# Patient Record
Sex: FEMALE | Race: WHITE | NOT HISPANIC OR LATINO | Employment: OTHER | URBAN - METROPOLITAN AREA
[De-identification: names, ages, dates, MRNs, and addresses within clinical notes are randomized per-mention and may not be internally consistent; named-entity substitution may affect disease eponyms.]

---

## 2016-06-16 LAB — CRC RECOMMENDATION EXT: NORMAL

## 2019-12-03 ENCOUNTER — HISTORICAL (OUTPATIENT)
Dept: ADMINISTRATIVE | Facility: HOSPITAL | Age: 69
End: 2019-12-03

## 2019-12-17 ENCOUNTER — HISTORICAL (OUTPATIENT)
Dept: ADMINISTRATIVE | Facility: HOSPITAL | Age: 69
End: 2019-12-17

## 2020-02-01 LAB
BILIRUB SERPL-MCNC: NORMAL MG/DL
BLOOD URINE, POC: NEGATIVE
CLARITY, POC UA: NORMAL
GLUCOSE UR QL STRIP: NORMAL
KETONES UR QL STRIP: NEGATIVE
LEUKOCYTE EST, POC UA: NEGATIVE
NITRITE, POC UA: NEGATIVE
PH, POC UA: 5
PROTEIN, POC: NEGATIVE
SPECIFIC GRAVITY, POC UA: 1.02
UROBILINOGEN, POC UA: NORMAL

## 2020-05-11 ENCOUNTER — HISTORICAL (OUTPATIENT)
Dept: ADMINISTRATIVE | Facility: HOSPITAL | Age: 70
End: 2020-05-11

## 2020-05-11 LAB
ABS NEUT (OLG): 7.59 X10(3)/MCL (ref 2.1–9.2)
ALBUMIN SERPL-MCNC: 4 GM/DL (ref 3.4–5)
ALBUMIN/GLOB SERPL: 1.1 RATIO (ref 1.1–2)
ALP SERPL-CCNC: 114 UNIT/L (ref 40–150)
ALT SERPL-CCNC: 6 UNIT/L (ref 0–55)
AST SERPL-CCNC: 15 UNIT/L (ref 5–34)
BASOPHILS # BLD AUTO: 0 X10(3)/MCL (ref 0–0.2)
BASOPHILS NFR BLD AUTO: 0.3 %
BILIRUB SERPL-MCNC: 0.2 MG/DL
BILIRUBIN DIRECT+TOT PNL SERPL-MCNC: 0.1 MG/DL (ref 0–0.5)
BILIRUBIN DIRECT+TOT PNL SERPL-MCNC: 0.1 MG/DL (ref 0–0.8)
BUN SERPL-MCNC: 25 MG/DL (ref 9.8–20.1)
CALCIUM SERPL-MCNC: 9.6 MG/DL (ref 8.4–10.2)
CEA SERPL-MCNC: <1.73 MG/ML (ref 0–3)
CHLORIDE SERPL-SCNC: 104 MMOL/L (ref 98–107)
CO2 SERPL-SCNC: 23 MMOL/L (ref 23–31)
CREAT SERPL-MCNC: 0.76 MG/DL (ref 0.55–1.02)
EOSINOPHIL # BLD AUTO: 0.2 X10(3)/MCL (ref 0–0.9)
EOSINOPHIL NFR BLD AUTO: 1.7 %
ERYTHROCYTE [DISTWIDTH] IN BLOOD BY AUTOMATED COUNT: 15.7 % (ref 11.5–17)
GLOBULIN SER-MCNC: 3.7 GM/DL (ref 2.4–3.5)
GLUCOSE SERPL-MCNC: 128 MG/DL (ref 82–115)
HCT VFR BLD AUTO: 38.5 % (ref 37–47)
HGB BLD-MCNC: 12.3 GM/DL (ref 12–16)
LYMPHOCYTES # BLD AUTO: 2 X10(3)/MCL (ref 0.6–4.6)
LYMPHOCYTES NFR BLD AUTO: 18.9 %
MCH RBC QN AUTO: 26.4 PG (ref 27–31)
MCHC RBC AUTO-ENTMCNC: 31.9 GM/DL (ref 33–36)
MCV RBC AUTO: 82.6 FL (ref 80–94)
MONOCYTES # BLD AUTO: 0.5 X10(3)/MCL (ref 0.1–1.3)
MONOCYTES NFR BLD AUTO: 5.1 %
NEUTROPHILS # BLD AUTO: 7.6 X10(3)/MCL (ref 2.1–9.2)
NEUTROPHILS NFR BLD AUTO: 73.8 %
PLATELET # BLD AUTO: 306 X10(3)/MCL (ref 130–400)
PMV BLD AUTO: 10.3 FL (ref 9.4–12.4)
POTASSIUM SERPL-SCNC: 4.1 MMOL/L (ref 3.5–5.1)
PROT SERPL-MCNC: 7.7 GM/DL (ref 5.8–7.6)
RBC # BLD AUTO: 4.66 X10(6)/MCL (ref 4.2–5.4)
SODIUM SERPL-SCNC: 138 MMOL/L (ref 136–145)
WBC # SPEC AUTO: 10.3 X10(3)/MCL (ref 4.5–11.5)

## 2020-07-13 ENCOUNTER — HISTORICAL (OUTPATIENT)
Dept: ADMINISTRATIVE | Facility: HOSPITAL | Age: 70
End: 2020-07-13

## 2020-07-13 LAB
ABS NEUT (OLG): 6.69 X10(3)/MCL (ref 2.1–9.2)
ALBUMIN SERPL-MCNC: 4.3 GM/DL (ref 3.4–4.8)
ALBUMIN/GLOB SERPL: 1.2 RATIO (ref 1.1–2)
ALP SERPL-CCNC: 112 UNIT/L (ref 40–150)
ALT SERPL-CCNC: 9 UNIT/L (ref 0–55)
AST SERPL-CCNC: 20 UNIT/L (ref 5–34)
BASOPHILS # BLD AUTO: 0.1 X10(3)/MCL (ref 0–0.2)
BASOPHILS NFR BLD AUTO: 0.7 %
BILIRUB SERPL-MCNC: 0.5 MG/DL
BILIRUBIN DIRECT+TOT PNL SERPL-MCNC: 0.2 MG/DL (ref 0–0.5)
BILIRUBIN DIRECT+TOT PNL SERPL-MCNC: 0.3 MG/DL (ref 0–0.8)
BUN SERPL-MCNC: 16.6 MG/DL (ref 9.8–20.1)
CALCIUM SERPL-MCNC: 9.8 MG/DL (ref 8.4–10.2)
CEA SERPL-MCNC: <1.73 NG/ML (ref 0–3)
CHLORIDE SERPL-SCNC: 105 MMOL/L (ref 98–107)
CO2 SERPL-SCNC: 27 MMOL/L (ref 23–31)
CREAT SERPL-MCNC: 0.92 MG/DL (ref 0.55–1.02)
EOSINOPHIL # BLD AUTO: 0.1 X10(3)/MCL (ref 0–0.9)
EOSINOPHIL NFR BLD AUTO: 1.4 %
ERYTHROCYTE [DISTWIDTH] IN BLOOD BY AUTOMATED COUNT: 15.4 % (ref 11.5–17)
GLOBULIN SER-MCNC: 3.7 GM/DL (ref 2.4–3.5)
GLUCOSE SERPL-MCNC: 83 MG/DL (ref 82–115)
HCT VFR BLD AUTO: 39.5 % (ref 37–47)
HGB BLD-MCNC: 12.4 GM/DL (ref 12–16)
LYMPHOCYTES # BLD AUTO: 1.7 X10(3)/MCL (ref 0.6–4.6)
LYMPHOCYTES NFR BLD AUTO: 18.4 %
MCH RBC QN AUTO: 27.1 PG (ref 27–31)
MCHC RBC AUTO-ENTMCNC: 31.4 GM/DL (ref 33–36)
MCV RBC AUTO: 86.4 FL (ref 80–94)
MONOCYTES # BLD AUTO: 0.6 X10(3)/MCL (ref 0.1–1.3)
MONOCYTES NFR BLD AUTO: 6.2 %
NEUTROPHILS # BLD AUTO: 6.7 X10(3)/MCL (ref 2.1–9.2)
NEUTROPHILS NFR BLD AUTO: 73.2 %
PLATELET # BLD AUTO: 329 X10(3)/MCL (ref 130–400)
PMV BLD AUTO: 10.7 FL (ref 9.4–12.4)
POTASSIUM SERPL-SCNC: 4.6 MMOL/L (ref 3.5–5.1)
PROT SERPL-MCNC: 8 GM/DL (ref 5.8–7.6)
RBC # BLD AUTO: 4.57 X10(6)/MCL (ref 4.2–5.4)
SODIUM SERPL-SCNC: 142 MMOL/L (ref 136–145)
WBC # SPEC AUTO: 9.1 X10(3)/MCL (ref 4.5–11.5)

## 2020-10-14 ENCOUNTER — HISTORICAL (OUTPATIENT)
Dept: HEMATOLOGY/ONCOLOGY | Facility: CLINIC | Age: 70
End: 2020-10-14

## 2020-10-14 LAB
ABS NEUT (OLG): 8.21 X10(3)/MCL (ref 2.1–9.2)
ALBUMIN SERPL-MCNC: 4.4 GM/DL (ref 3.4–5)
ALBUMIN/GLOB SERPL: 1.2 RATIO (ref 1.1–2)
ALP SERPL-CCNC: 120 UNIT/L (ref 40–150)
ALT SERPL-CCNC: 7 UNIT/L (ref 0–55)
AST SERPL-CCNC: 14 UNIT/L (ref 5–34)
BASOPHILS # BLD AUTO: 0.1 X10(3)/MCL (ref 0–0.2)
BASOPHILS NFR BLD AUTO: 0.6 %
BILIRUB SERPL-MCNC: 0.3 MG/DL
BILIRUBIN DIRECT+TOT PNL SERPL-MCNC: 0.1 MG/DL (ref 0–0.5)
BILIRUBIN DIRECT+TOT PNL SERPL-MCNC: 0.2 MG/DL (ref 0–0.8)
BUN SERPL-MCNC: 18.8 MG/DL (ref 9.8–20.1)
CALCIUM SERPL-MCNC: 9.4 MG/DL (ref 8.4–10.2)
CEA SERPL-MCNC: <1.73 NG/ML (ref 0–3)
CHLORIDE SERPL-SCNC: 103 MMOL/L (ref 98–107)
CO2 SERPL-SCNC: 24 MMOL/L (ref 23–31)
CREAT SERPL-MCNC: 0.96 MG/DL (ref 0.55–1.02)
EOSINOPHIL # BLD AUTO: 0.2 X10(3)/MCL (ref 0–0.9)
EOSINOPHIL NFR BLD AUTO: 1.6 %
ERYTHROCYTE [DISTWIDTH] IN BLOOD BY AUTOMATED COUNT: 14 % (ref 11.5–17)
GLOBULIN SER-MCNC: 3.6 GM/DL (ref 2.4–3.5)
GLUCOSE SERPL-MCNC: 178 MG/DL (ref 82–115)
HCT VFR BLD AUTO: 40.2 % (ref 37–47)
HGB BLD-MCNC: 12.9 GM/DL (ref 12–16)
LYMPHOCYTES # BLD AUTO: 2 X10(3)/MCL (ref 0.6–4.6)
LYMPHOCYTES NFR BLD AUTO: 18.5 %
MCH RBC QN AUTO: 27.3 PG (ref 27–31)
MCHC RBC AUTO-ENTMCNC: 32.1 GM/DL (ref 33–36)
MCV RBC AUTO: 85 FL (ref 80–94)
MONOCYTES # BLD AUTO: 0.6 X10(3)/MCL (ref 0.1–1.3)
MONOCYTES NFR BLD AUTO: 5.2 %
NEUTROPHILS # BLD AUTO: 8.2 X10(3)/MCL (ref 2.1–9.2)
NEUTROPHILS NFR BLD AUTO: 73.9 %
PLATELET # BLD AUTO: 297 X10(3)/MCL (ref 130–400)
PMV BLD AUTO: 10.8 FL (ref 9.4–12.4)
POTASSIUM SERPL-SCNC: 4.2 MMOL/L (ref 3.5–5.1)
PROT SERPL-MCNC: 8 GM/DL (ref 5.8–7.6)
RBC # BLD AUTO: 4.73 X10(6)/MCL (ref 4.2–5.4)
SODIUM SERPL-SCNC: 141 MMOL/L (ref 136–145)
WBC # SPEC AUTO: 11.1 X10(3)/MCL (ref 4.5–11.5)

## 2021-04-27 ENCOUNTER — HISTORICAL (OUTPATIENT)
Dept: ADMINISTRATIVE | Facility: HOSPITAL | Age: 71
End: 2021-04-27

## 2021-04-27 LAB
ABS NEUT (OLG): 5.35 X10(3)/MCL (ref 2.1–9.2)
ALBUMIN SERPL-MCNC: 4.2 GM/DL (ref 3.4–4.8)
ALBUMIN/GLOB SERPL: 1.1 RATIO (ref 1.1–2)
ALP SERPL-CCNC: 123 UNIT/L (ref 40–150)
ALT SERPL-CCNC: 8 UNIT/L (ref 0–55)
AST SERPL-CCNC: 14 UNIT/L (ref 5–34)
BASOPHILS # BLD AUTO: 0 X10(3)/MCL (ref 0–0.2)
BASOPHILS NFR BLD AUTO: 0.7 %
BILIRUB SERPL-MCNC: 0.2 MG/DL
BILIRUBIN DIRECT+TOT PNL SERPL-MCNC: 0.1 MG/DL (ref 0–0.5)
BILIRUBIN DIRECT+TOT PNL SERPL-MCNC: 0.1 MG/DL (ref 0–0.8)
BUN SERPL-MCNC: 22.9 MG/DL (ref 9.8–20.1)
CALCIUM SERPL-MCNC: 10.2 MG/DL (ref 8.4–10.2)
CEA SERPL-MCNC: <1.73 NG/ML (ref 0–3)
CHLORIDE SERPL-SCNC: 105 MMOL/L (ref 98–107)
CO2 SERPL-SCNC: 27 MMOL/L (ref 23–31)
CREAT SERPL-MCNC: 0.96 MG/DL (ref 0.55–1.02)
EOSINOPHIL # BLD AUTO: 0.2 X10(3)/MCL (ref 0–0.9)
EOSINOPHIL NFR BLD AUTO: 2.5 %
ERYTHROCYTE [DISTWIDTH] IN BLOOD BY AUTOMATED COUNT: 14.7 % (ref 11.5–17)
GLOBULIN SER-MCNC: 3.7 GM/DL (ref 2.4–3.5)
GLUCOSE SERPL-MCNC: 122 MG/DL (ref 82–115)
HCT VFR BLD AUTO: 41.2 % (ref 37–47)
HGB BLD-MCNC: 12.7 GM/DL (ref 12–16)
LYMPHOCYTES # BLD AUTO: 1.5 X10(3)/MCL (ref 0.6–4.6)
LYMPHOCYTES NFR BLD AUTO: 19.4 %
MCH RBC QN AUTO: 27.5 PG (ref 27–31)
MCHC RBC AUTO-ENTMCNC: 30.8 GM/DL (ref 33–36)
MCV RBC AUTO: 89.4 FL (ref 80–94)
MONOCYTES # BLD AUTO: 0.5 X10(3)/MCL (ref 0.1–1.3)
MONOCYTES NFR BLD AUTO: 6.7 %
NEUTROPHILS # BLD AUTO: 5.4 X10(3)/MCL (ref 2.1–9.2)
NEUTROPHILS NFR BLD AUTO: 70.4 %
PLATELET # BLD AUTO: 279 X10(3)/MCL (ref 130–400)
PMV BLD AUTO: 10.6 FL (ref 9.4–12.4)
POTASSIUM SERPL-SCNC: 4.9 MMOL/L (ref 3.5–5.1)
PROT SERPL-MCNC: 7.9 GM/DL (ref 5.8–7.6)
RBC # BLD AUTO: 4.61 X10(6)/MCL (ref 4.2–5.4)
SODIUM SERPL-SCNC: 142 MMOL/L (ref 136–145)
WBC # SPEC AUTO: 7.6 X10(3)/MCL (ref 4.5–11.5)

## 2021-07-27 ENCOUNTER — HISTORICAL (OUTPATIENT)
Dept: RESPIRATORY THERAPY | Facility: HOSPITAL | Age: 71
End: 2021-07-27

## 2021-07-27 ENCOUNTER — HISTORICAL (OUTPATIENT)
Dept: HEMATOLOGY/ONCOLOGY | Facility: CLINIC | Age: 71
End: 2021-07-27

## 2021-07-27 LAB
ABS NEUT (OLG): 7.67 X10(3)/MCL (ref 2.1–9.2)
ALBUMIN SERPL-MCNC: 4.1 GM/DL (ref 3.4–4.8)
ALBUMIN/GLOB SERPL: 1.1 RATIO (ref 1.1–2)
ALP SERPL-CCNC: 114 UNIT/L (ref 40–150)
ALT SERPL-CCNC: 9 UNIT/L (ref 0–55)
AST SERPL-CCNC: 17 UNIT/L (ref 5–34)
BASOPHILS # BLD AUTO: 0.1 X10(3)/MCL (ref 0–0.2)
BASOPHILS NFR BLD AUTO: 0.6 %
BILIRUB SERPL-MCNC: 0.3 MG/DL
BILIRUBIN DIRECT+TOT PNL SERPL-MCNC: 0.1 MG/DL (ref 0–0.5)
BILIRUBIN DIRECT+TOT PNL SERPL-MCNC: 0.2 MG/DL (ref 0–0.8)
BUN SERPL-MCNC: 21.5 MG/DL (ref 9.8–20.1)
CALCIUM SERPL-MCNC: 9.9 MG/DL (ref 8.4–10.2)
CEA SERPL-MCNC: <1.73 NG/ML (ref 0–3)
CHLORIDE SERPL-SCNC: 101 MMOL/L (ref 98–107)
CO2 SERPL-SCNC: 24 MMOL/L (ref 23–31)
CREAT SERPL-MCNC: 0.85 MG/DL (ref 0.55–1.02)
EOSINOPHIL # BLD AUTO: 0.2 X10(3)/MCL (ref 0–0.9)
EOSINOPHIL NFR BLD AUTO: 1.9 %
ERYTHROCYTE [DISTWIDTH] IN BLOOD BY AUTOMATED COUNT: 14.4 % (ref 11.5–17)
GLOBULIN SER-MCNC: 3.8 GM/DL (ref 2.4–3.5)
GLUCOSE SERPL-MCNC: 89 MG/DL (ref 82–115)
HCT VFR BLD AUTO: 45.3 % (ref 37–47)
HGB BLD-MCNC: 14.1 GM/DL (ref 12–16)
LYMPHOCYTES # BLD AUTO: 2 X10(3)/MCL (ref 0.6–4.6)
LYMPHOCYTES NFR BLD AUTO: 19 %
MCH RBC QN AUTO: 27.5 PG (ref 27–31)
MCHC RBC AUTO-ENTMCNC: 31.1 GM/DL (ref 33–36)
MCV RBC AUTO: 88.3 FL (ref 80–94)
MONOCYTES # BLD AUTO: 0.5 X10(3)/MCL (ref 0.1–1.3)
MONOCYTES NFR BLD AUTO: 5.2 %
NEUTROPHILS # BLD AUTO: 7.7 X10(3)/MCL (ref 2.1–9.2)
NEUTROPHILS NFR BLD AUTO: 73.1 %
PLATELET # BLD AUTO: 278 X10(3)/MCL (ref 130–400)
PMV BLD AUTO: 10.3 FL (ref 9.4–12.4)
POTASSIUM SERPL-SCNC: 4.5 MMOL/L (ref 3.5–5.1)
PROT SERPL-MCNC: 7.9 GM/DL (ref 5.8–7.6)
RBC # BLD AUTO: 5.13 X10(6)/MCL (ref 4.2–5.4)
SODIUM SERPL-SCNC: 139 MMOL/L (ref 136–145)
WBC # SPEC AUTO: 10.5 X10(3)/MCL (ref 4.5–11.5)

## 2021-12-20 LAB
LEFT EYE DM RETINOPATHY: NEGATIVE
RIGHT EYE DM RETINOPATHY: NEGATIVE

## 2022-02-16 ENCOUNTER — HISTORICAL (OUTPATIENT)
Dept: HEMATOLOGY/ONCOLOGY | Facility: CLINIC | Age: 72
End: 2022-02-16

## 2022-02-16 LAB
ABS NEUT (OLG): 10.19 (ref 2.1–9.2)
ALBUMIN SERPL-MCNC: 4.3 G/DL (ref 3.4–4.8)
ALBUMIN/GLOB SERPL: 1.2 {RATIO} (ref 1.1–2)
ALP SERPL-CCNC: 117 U/L (ref 40–150)
ALT SERPL-CCNC: 11 U/L (ref 0–55)
AST SERPL-CCNC: 20 U/L (ref 5–34)
BASOPHILS # BLD AUTO: 0.1 10*3/UL (ref 0–0.2)
BASOPHILS NFR BLD AUTO: 0.5 %
BILIRUB SERPL-MCNC: 0.3 MG/DL
BILIRUBIN DIRECT+TOT PNL SERPL-MCNC: 0.1 (ref 0–0.5)
BILIRUBIN DIRECT+TOT PNL SERPL-MCNC: 0.2 (ref 0–0.8)
BUN SERPL-MCNC: 21.3 MG/DL (ref 9.8–20.1)
CALCIUM SERPL-MCNC: 10.1 MG/DL (ref 8.7–10.5)
CEA SERPL-MCNC: <1.73 NG/ML (ref 0–3)
CHLORIDE SERPL-SCNC: 100 MMOL/L (ref 98–107)
CO2 SERPL-SCNC: 26 MMOL/L (ref 23–31)
CREAT SERPL-MCNC: 1.15 MG/DL (ref 0.55–1.02)
EOSINOPHIL # BLD AUTO: 0.1 10*3/UL (ref 0–0.9)
EOSINOPHIL NFR BLD AUTO: 0.9 %
ERYTHROCYTE [DISTWIDTH] IN BLOOD BY AUTOMATED COUNT: 14.5 % (ref 11.5–17)
GLOBULIN SER-MCNC: 3.7 G/DL (ref 2.4–3.5)
GLUCOSE SERPL-MCNC: 178 MG/DL (ref 82–115)
HCT VFR BLD AUTO: 41 % (ref 37–47)
HEMOLYSIS INTERF INDEX SERPL-ACNC: 8
HGB BLD-MCNC: 13 G/DL (ref 12–16)
ICTERIC INTERF INDEX SERPL-ACNC: 0
LIPEMIC INTERF INDEX SERPL-ACNC: 15
LYMPHOCYTES # BLD AUTO: 1.6 10*3/UL (ref 0.6–4.6)
LYMPHOCYTES NFR BLD AUTO: 13.1 %
MANUAL DIFF? (OHS): NO
MCH RBC QN AUTO: 28.1 PG (ref 27–31)
MCHC RBC AUTO-ENTMCNC: 31.7 G/DL (ref 33–36)
MCV RBC AUTO: 88.7 FL (ref 80–94)
MONOCYTES # BLD AUTO: 0.5 10*3/UL (ref 0.1–1.3)
MONOCYTES NFR BLD AUTO: 4.3 %
NEUTROPHILS # BLD AUTO: 10.2 10*3/UL (ref 2.1–9.2)
NEUTROPHILS NFR BLD AUTO: 81 %
PLATELET # BLD AUTO: 326 10*3/UL (ref 130–400)
PMV BLD AUTO: 10.5 FL (ref 9.4–12.4)
POTASSIUM SERPL-SCNC: 5.1 MMOL/L (ref 3.5–5.1)
PROT SERPL-MCNC: 8 G/DL (ref 5.8–7.6)
RBC # BLD AUTO: 4.62 10*6/UL (ref 4.2–5.4)
SODIUM SERPL-SCNC: 137 MMOL/L (ref 136–145)
WBC # SPEC AUTO: 12.6 10*3/UL (ref 4.5–11.5)

## 2022-03-03 ENCOUNTER — HISTORICAL (OUTPATIENT)
Dept: ADMINISTRATIVE | Facility: HOSPITAL | Age: 72
End: 2022-03-03

## 2022-03-03 LAB
CREAT UR-MCNC: 68 MG/DL (ref 45–106)
MICROALBUMIN UR-MCNC: 10.1
MICROALBUMIN/CREAT RATIO PNL UR: 14.9 (ref 0–30)

## 2022-04-10 ENCOUNTER — HISTORICAL (OUTPATIENT)
Dept: ADMINISTRATIVE | Facility: HOSPITAL | Age: 72
End: 2022-04-10
Payer: MEDICARE

## 2022-04-26 VITALS
DIASTOLIC BLOOD PRESSURE: 82 MMHG | OXYGEN SATURATION: 99 % | SYSTOLIC BLOOD PRESSURE: 136 MMHG | WEIGHT: 145.5 LBS | HEIGHT: 63 IN | BODY MASS INDEX: 25.78 KG/M2

## 2022-04-30 NOTE — OP NOTE
Patient:   Ileana Mathews            MRN: [id number]            FIN: [id number]               Age:   69 years     Sex:  Female     :  1950   Associated Diagnoses:   None   Author:   Jose J Mathias MD          Preoperative Diagnosis: Nuclear sclerotic cataract [Left /] eye.    Postoperative Diagnosis: Same.    Anesthesia: Local    Procedure: Phacoemulsification of cataract with posterior chamber implant of the [Left/\ eye.    This patient is a [  ] year old who was given a diagnosis of severe cataracts of both eyes with [20/40  ] vision [ os ]. The risks and benefits of cataract surgery were explained; the patient was consented and desired to have the surgery done. The patient was given topical anesthesia using 1% Lidocaine Jelly and the patient was prepped and draped in sterile fashion.    The microscope was centered and focused in a temporal position and a super sharp blade was used to make a paracentesis in the corneal limbus. Dispersive Viscoelastic was then placed in the anterior chamber. A 2.4 mm keratome blade was used to enter the anterior chamber in a self-sealing type technique. The cystatome blade was then used to initiate a capsulorrhexis which was completed 360 degrees with Utrata forceps. BSS in an AC cannula was then used to perform hydrodissection and hydrodilineation. Phacoemulsification was then accomplished by creating a deep groove down the middle of the nucleus, then  it into 2 halves with the phacotip and the Jo chopper and finishing the removal with a stop and chop technique.  The cortex was then removed using the I and A unit. All the lens material was removed without any tears to the anterior or posterior capsule. Cohesive Viscoelastic was then injected to inflate the capsular bag. A foldable lens was then injected into the capsular bag. The lens was observed to be securely placed into the bag. The I and A was then used to remove the remaining viscoelastic. A  10-0 Biosorb incisional suture [was not] placed. BSS through an A/C cannula was used to perform stromal hydration in the wound. BSS was also used again to reform the chamber and bring the eye to physiologic IOP.  The wound was checked for leaks and none were found. Copious Vigomox drop and 1-2 drops of, Prednisolone Acetate 1% were placed topically prior to removing the lid specular and drapes.  The drapes were then removed. The patient will be sent to recovery and instructed to use Vigamox, Ketorolac, and Predinsolone Acetate 1% three times a day as well as follow all instructions on the postoperative sheet given to them and explained after surgery. The patient will return to see Dr. Mathias at their scheduled appointment within 24-36 hours after surgery.      Jose J Mathias M.D.              FAUSTINO/[facility]ine           [date / time]

## 2022-05-23 DIAGNOSIS — C50.512 MALIGNANT NEOPLASM OF LOWER-OUTER QUADRANT OF LEFT FEMALE BREAST, UNSPECIFIED ESTROGEN RECEPTOR STATUS: Primary | ICD-10-CM

## 2022-05-27 PROCEDURE — 86300 IMMUNOASSAY TUMOR CA 15-3: CPT | Performed by: INTERNAL MEDICINE

## 2022-07-22 PROBLEM — C50.412 MALIGNANT NEOPLASM OF UPPER-OUTER QUADRANT OF LEFT FEMALE BREAST: Status: ACTIVE | Noted: 2022-07-22

## 2022-07-22 PROBLEM — C78.02 SECONDARY MALIGNANT NEOPLASM OF LEFT LUNG: Status: ACTIVE | Noted: 2022-07-22

## 2022-07-22 NOTE — PROGRESS NOTES
Subjective:       Patient ID: Ileana Mathews is a 71 y.o. female.    Chief Complaint: I feel really good    Diagnosis: Recurrent metastatic breast cancer - ER low +/MT -, HER-2 negative                   Solitary left lung met s/p left pneumonectomy 11/18                  Postmenopausal s/p hysterectomy      + COVID-19 vaccination    Current Treatment: Arimidex 1 mg daily (restarted 11/18)  Treatment History: Neoadjuvant TC x4 2008 --> B mastectomies --> TC x4 --> XRT --> Arimidex x 7 yrs    Clinical History:  Patient was initially diagnosed with a stage III left breast cancer in 2008 with 2 out of 27 positive axillary lymph nodes. She was treated by Dr. Donald Foss in [place]. Her pathology was mixed ductal and lobular - ER +88%, MT +26% and HER-2 negative ([facility]). She underwent neoadjuvant chemotherapy with 4 cycles of TC followed by bilateral mastectomies and 4 more cycles of TC. After completion of chemotherapy, she received adjuvant radiation therapy followed by Arimidex for 7 years. She transitioned to the [facility] group 6/16 with Dr. Yan. Ongoing observation was recommended. A solitary pulmonary nodule was discovered on surveillance imaging in the left lung in 2018. Follow-up scan showed increased size of the nodule with no other findings of metastatic disease. PET scan showed no additional suspicious areas. She was referred to CV surgery for a planned wedge resection of the pulmonary nodule. Due to complications at the time of surgery, she required a left pneumonectomy. I do not have a copy of the operative report, but the patient stated there were bleeding complications due to a vascular injury. Surgical pathology showed a metastatic adenocarcinoma consistent with breast primary measuring 1.1 cm. Cells were positive for CK7, MIREYA-3, GCDFP15 and mammoglobin and negative for TTF-1, CK 20, CDX2 and PAX-8. 10 resected lymph nodes were negative for involvement. Tumor was low ER +1.8%, MT  negative and HER-2 negative ([facility]). She was placed back on Arimidex 1 mg daily.    Surveillance CT of the chest 12/5/19 ([facility]) showed postoperative changes from previous left pneumonectomy and no findings suspicious for metastatic disease including the visualized upper abdomen. She was seen in the ER for lower abdominal pain 2/1/20. CT scan of the abdomen and pelvis without contrast showed a large amount of stool in the colon and rectum, cholelithiasis and a left renal cyst. CT scan of the chest 2/27/20 noted a 3 mm nodule in the right anterior lung which on retrospect was present and stable on the previous CT scan. Left pneumonectomy changes were stable with mediastinal shift. There were no other lesions suspicious for metastatic disease.    She was seen as a new patient at [facility]11[facility]20 to establish ongoing Oncology care and follow-up. She was still taking Arimidex 1 mg daily. She had no significant side effects related to the treatment. She was continued on Arimidex. Bone density exam 10/15/20 showed osteopenia of the AP spine, left and right hip with T scores of -1.0, -2.0 and -2.0 respectively.  CT scans of the chest, abdomen and pelvis 8/23/21 showed changes from her prior pneumonectomy with a small left pleural fluid collection and chronic emphysematous changes in the right lung.  There was a small, stable RUL nodule unchanged from previous exams.  There was no evidence of metastatic disease in the abdomen or pelvis.        Interval History   Mrs. Mathews is here today by herself for a follow-up appointment.  She was due to come in for follow-up in May, and had to reschedule.  She feels well.  She denies any recent illnesses or procedures.  She is taking her Arimidex daily as directed. Previous CT scans of the chest, abdomen and pelvis 2/16/22 ([facility]) showed stable changes from prior left pneumonectomy and a subcentimeter triangular focus in the right upper lobe unchanged from her  previous exams.  There were no abnormal or suspicious findings in the abdomen or pelvis. She is now taking THC gummies (2) at bedtime.  Her blood pressure has improved and is no longer taking Xanax.  Overall, she feels much better.  She is still taking Lyrica for neuropathic pain.  She denies any changes to her self breast examination.  Laboratory 5/27/22 was unremarkable and reviewed today with the patient.    Review of patient's allergies indicates:  No Known Allergies   Review of Systems   Constitutional: Positive for fatigue.   HENT: Negative.    Eyes: Negative.    Respiratory: Positive for shortness of breath (URIBE).    Cardiovascular: Negative.    Gastrointestinal: Negative.    Endocrine: Negative.    Genitourinary: Negative.    Integumentary:  Negative.   Allergic/Immunologic: Negative.    Neurological: Negative.    Hematological: Negative for adenopathy.   Psychiatric/Behavioral: Negative.          PMHx:  HTN, hyperlipidemia, diabetes mellitus, SVT, COPD, GERD, arthritis, anxiety/depression, panic attacks    PSHx:  Tonsils, bladder suspension, hysterectomy/BSO, rhinoplasty, Mediport placement and removal, left pneumonectomy    SH:  Former smoker 1-2.5 PPD x 40 yrs, quit 11/18. Rare alcohol use. Lives in [place] with her , retired dispatcher for the [employer]    FH:  Her mother had bilateral breast cancer, paternal grandmother had lung cancer.        Objective:     Vitals:    07/25/22 1431   BP: 120/71   Pulse: 71   Resp: 18   Temp: 98 °F (36.7 °C)         Physical Exam  Constitutional:       Appearance: Normal appearance.   HENT:      Head: Normocephalic.      Mouth/Throat:      Mouth: Mucous membranes are dry.   Eyes:      General: No scleral icterus.     Pupils: Pupils are equal, round, and reactive to light.   Cardiovascular:      Rate and Rhythm: Normal rate and regular rhythm.   Pulmonary:      Comments: Absent breath sounds on the left, clear on the right  Chest:      Chest wall: No  tenderness.   Breasts:      Right: No mass, skin change, axillary adenopathy or supraclavicular adenopathy.      Left: No mass, skin change, axillary adenopathy or supraclavicular adenopathy.        Comments: Bilateral mastectomies  Abdominal:      General: Bowel sounds are normal.      Palpations: Abdomen is soft.   Musculoskeletal:         General: Normal range of motion.      Cervical back: Normal range of motion.   Lymphadenopathy:      Cervical: No cervical adenopathy.      Upper Body:      Right upper body: No supraclavicular or axillary adenopathy.      Left upper body: No supraclavicular or axillary adenopathy.   Skin:     General: Skin is warm and dry.   Neurological:      General: No focal deficit present.      Mental Status: She is alert and oriented to person, place, and time.   Psychiatric:         Mood and Affect: Mood normal.         Behavior: Behavior normal.       ECOG SCORE    1 - Restricted in strenuous activity-ambulatory and able to carry out work of a light nature            Lab Results   Component Value Date/Time    WBC 9.9 05/27/2022 01:14 PM    RBC 4.92 05/27/2022 01:14 PM    HGB 13.5 05/27/2022 01:14 PM    HCT 42.9 05/27/2022 01:14 PM     05/27/2022 01:14 PM    ABSNEUTRO 7.4 05/27/2022 01:14 PM    NEUTROAUTO 74.6 05/27/2022 01:14 PM    ABSLYMPH 1.72 05/27/2022 01:14 PM    LYMPHAUTO 17.3 05/27/2022 01:14 PM    ABSEOS 0.16 05/27/2022 01:14 PM    EOSAUTO 1.6 05/27/2022 01:14 PM    ABSBASO 0.05 05/27/2022 01:14 PM    BASOPHILAUTO 0.5 05/27/2022 01:14 PM        Chemistry        Component Value Date/Time     05/27/2022 1314    K 4.2 05/27/2022 1314    CO2 26 05/27/2022 1314    BUN 18.1 05/27/2022 1314    CREATININE 1.22 (H) 05/27/2022 1314        Component Value Date/Time    CALCIUM 9.5 05/27/2022 1314    ALKPHOS 119 05/27/2022 1314    AST 12 05/27/2022 1314    ALT 7 05/27/2022 1314    BILITOT 0.3 05/27/2022 1314    EGFRNONAA 46 05/27/2022 1314        Assessment:   Recurrent  metastatic breast cancer - initial diagnosis 2008, ER/MI +, HER-2 negative  Solitary pulmonary met s/p left pneumonectomy 11/18 - ER low +, MI negative, HER-2 negative  Postmenopausal s/p hysterectomy  Osteopenia          Plan:   Continue Arimidex.  RTC 3 months for follow-up with repeat lab, including tumor markers.  Repeat CT scans 2/23.  All questions answered.    ANGEL OLSON, FNP-C    Other Physicians  Dr. Josey Gilbert

## 2022-07-25 ENCOUNTER — OFFICE VISIT (OUTPATIENT)
Dept: HEMATOLOGY/ONCOLOGY | Facility: CLINIC | Age: 72
End: 2022-07-25
Payer: MEDICARE

## 2022-07-25 VITALS
HEIGHT: 62 IN | DIASTOLIC BLOOD PRESSURE: 71 MMHG | OXYGEN SATURATION: 98 % | RESPIRATION RATE: 18 BRPM | SYSTOLIC BLOOD PRESSURE: 120 MMHG | BODY MASS INDEX: 26.49 KG/M2 | HEART RATE: 71 BPM | WEIGHT: 143.94 LBS | TEMPERATURE: 98 F

## 2022-07-25 DIAGNOSIS — C78.02 SECONDARY MALIGNANT NEOPLASM OF LEFT LUNG: Primary | ICD-10-CM

## 2022-07-25 DIAGNOSIS — C50.412 MALIGNANT NEOPLASM OF UPPER-OUTER QUADRANT OF LEFT FEMALE BREAST, UNSPECIFIED ESTROGEN RECEPTOR STATUS: ICD-10-CM

## 2022-07-25 PROCEDURE — 1160F RVW MEDS BY RX/DR IN RCRD: CPT | Mod: CPTII,S$GLB,, | Performed by: NURSE PRACTITIONER

## 2022-07-25 PROCEDURE — 1159F PR MEDICATION LIST DOCUMENTED IN MEDICAL RECORD: ICD-10-PCS | Mod: CPTII,S$GLB,, | Performed by: NURSE PRACTITIONER

## 2022-07-25 PROCEDURE — 99999 PR PBB SHADOW E&M-EST. PATIENT-LVL V: CPT | Mod: PBBFAC,,, | Performed by: NURSE PRACTITIONER

## 2022-07-25 PROCEDURE — 3288F PR FALLS RISK ASSESSMENT DOCUMENTED: ICD-10-PCS | Mod: CPTII,S$GLB,, | Performed by: NURSE PRACTITIONER

## 2022-07-25 PROCEDURE — 1160F PR REVIEW ALL MEDS BY PRESCRIBER/CLIN PHARMACIST DOCUMENTED: ICD-10-PCS | Mod: CPTII,S$GLB,, | Performed by: NURSE PRACTITIONER

## 2022-07-25 PROCEDURE — 99213 OFFICE O/P EST LOW 20 MIN: CPT | Mod: S$GLB,,, | Performed by: NURSE PRACTITIONER

## 2022-07-25 PROCEDURE — 99999 PR PBB SHADOW E&M-EST. PATIENT-LVL V: ICD-10-PCS | Mod: PBBFAC,,, | Performed by: NURSE PRACTITIONER

## 2022-07-25 PROCEDURE — 3008F PR BODY MASS INDEX (BMI) DOCUMENTED: ICD-10-PCS | Mod: CPTII,S$GLB,, | Performed by: NURSE PRACTITIONER

## 2022-07-25 PROCEDURE — 1101F PR PT FALLS ASSESS DOC 0-1 FALLS W/OUT INJ PAST YR: ICD-10-PCS | Mod: CPTII,S$GLB,, | Performed by: NURSE PRACTITIONER

## 2022-07-25 PROCEDURE — 3074F SYST BP LT 130 MM HG: CPT | Mod: CPTII,S$GLB,, | Performed by: NURSE PRACTITIONER

## 2022-07-25 PROCEDURE — 3074F PR MOST RECENT SYSTOLIC BLOOD PRESSURE < 130 MM HG: ICD-10-PCS | Mod: CPTII,S$GLB,, | Performed by: NURSE PRACTITIONER

## 2022-07-25 PROCEDURE — 3008F BODY MASS INDEX DOCD: CPT | Mod: CPTII,S$GLB,, | Performed by: NURSE PRACTITIONER

## 2022-07-25 PROCEDURE — 1101F PT FALLS ASSESS-DOCD LE1/YR: CPT | Mod: CPTII,S$GLB,, | Performed by: NURSE PRACTITIONER

## 2022-07-25 PROCEDURE — 3078F DIAST BP <80 MM HG: CPT | Mod: CPTII,S$GLB,, | Performed by: NURSE PRACTITIONER

## 2022-07-25 PROCEDURE — 1126F PR PAIN SEVERITY QUANTIFIED, NO PAIN PRESENT: ICD-10-PCS | Mod: CPTII,S$GLB,, | Performed by: NURSE PRACTITIONER

## 2022-07-25 PROCEDURE — 99213 PR OFFICE/OUTPT VISIT, EST, LEVL III, 20-29 MIN: ICD-10-PCS | Mod: S$GLB,,, | Performed by: NURSE PRACTITIONER

## 2022-07-25 PROCEDURE — 1159F MED LIST DOCD IN RCRD: CPT | Mod: CPTII,S$GLB,, | Performed by: NURSE PRACTITIONER

## 2022-07-25 PROCEDURE — 1126F AMNT PAIN NOTED NONE PRSNT: CPT | Mod: CPTII,S$GLB,, | Performed by: NURSE PRACTITIONER

## 2022-07-25 PROCEDURE — 3078F PR MOST RECENT DIASTOLIC BLOOD PRESSURE < 80 MM HG: ICD-10-PCS | Mod: CPTII,S$GLB,, | Performed by: NURSE PRACTITIONER

## 2022-07-25 PROCEDURE — 3288F FALL RISK ASSESSMENT DOCD: CPT | Mod: CPTII,S$GLB,, | Performed by: NURSE PRACTITIONER

## 2022-07-25 RX ORDER — VITAMIN B COMPLEX
1 CAPSULE ORAL DAILY
COMMUNITY
Start: 2021-05-10

## 2022-07-25 RX ORDER — ESOMEPRAZOLE MAGNESIUM 40 MG/1
40 CAPSULE, DELAYED RELEASE ORAL DAILY
COMMUNITY
Start: 2022-07-22 | End: 2023-05-09

## 2022-07-25 RX ORDER — ALPRAZOLAM 1 MG/1
1 TABLET ORAL 3 TIMES DAILY PRN
COMMUNITY
Start: 2022-03-29 | End: 2022-09-06

## 2022-07-25 RX ORDER — AZELASTINE HYDROCHLORIDE, FLUTICASONE PROPIONATE 137; 50 UG/1; UG/1
1 SPRAY, METERED NASAL 2 TIMES DAILY
COMMUNITY
Start: 2022-03-03

## 2022-07-25 RX ORDER — DAPAGLIFLOZIN 10 MG/1
10 TABLET, FILM COATED ORAL DAILY
COMMUNITY
Start: 2022-07-22 | End: 2023-08-10

## 2022-07-25 RX ORDER — DULAGLUTIDE 1.5 MG/.5ML
1.5 INJECTION, SOLUTION SUBCUTANEOUS
COMMUNITY
Start: 2022-07-22 | End: 2023-08-10

## 2022-07-25 RX ORDER — ALBUTEROL SULFATE 90 UG/1
AEROSOL, METERED RESPIRATORY (INHALATION)
COMMUNITY
Start: 2022-07-22

## 2022-07-25 RX ORDER — AMLODIPINE BESYLATE 5 MG/1
1 TABLET ORAL DAILY
COMMUNITY
Start: 2022-07-22 | End: 2023-09-18 | Stop reason: SDUPTHER

## 2022-07-25 RX ORDER — AMOXICILLIN 500 MG
1 CAPSULE ORAL DAILY
COMMUNITY

## 2022-07-25 RX ORDER — GLIMEPIRIDE 4 MG/1
4 TABLET ORAL DAILY
COMMUNITY
Start: 2022-07-22 | End: 2023-09-18 | Stop reason: SDUPTHER

## 2022-07-25 RX ORDER — UMECLIDINIUM BROMIDE AND VILANTEROL TRIFENATATE 62.5; 25 UG/1; UG/1
1 POWDER RESPIRATORY (INHALATION) DAILY
COMMUNITY
Start: 2022-07-22 | End: 2023-03-06 | Stop reason: SDUPTHER

## 2022-07-25 RX ORDER — LANOLIN ALCOHOL/MO/W.PET/CERES
1000 CREAM (GRAM) TOPICAL DAILY
COMMUNITY
End: 2022-09-06

## 2022-07-25 RX ORDER — ISOSORBIDE MONONITRATE 30 MG/1
30 TABLET, EXTENDED RELEASE ORAL DAILY
COMMUNITY
Start: 2022-07-22 | End: 2022-09-06

## 2022-07-25 RX ORDER — CARVEDILOL 3.12 MG/1
3.12 TABLET ORAL 2 TIMES DAILY
COMMUNITY
Start: 2022-06-29 | End: 2022-09-06

## 2022-09-06 ENCOUNTER — OFFICE VISIT (OUTPATIENT)
Dept: INTERNAL MEDICINE | Facility: CLINIC | Age: 72
End: 2022-09-06
Payer: MEDICARE

## 2022-09-06 VITALS
DIASTOLIC BLOOD PRESSURE: 70 MMHG | WEIGHT: 142 LBS | BODY MASS INDEX: 25.16 KG/M2 | HEART RATE: 68 BPM | SYSTOLIC BLOOD PRESSURE: 132 MMHG | HEIGHT: 63 IN | OXYGEN SATURATION: 98 % | RESPIRATION RATE: 18 BRPM

## 2022-09-06 DIAGNOSIS — I27.0 PRIMARY PULMONARY HYPERTENSION: ICD-10-CM

## 2022-09-06 DIAGNOSIS — I10 HYPERTENSION, UNSPECIFIED TYPE: ICD-10-CM

## 2022-09-06 DIAGNOSIS — Z00.00 MEDICARE ANNUAL WELLNESS VISIT, SUBSEQUENT: ICD-10-CM

## 2022-09-06 DIAGNOSIS — I47.10 SUPRAVENTRICULAR TACHYCARDIA: ICD-10-CM

## 2022-09-06 DIAGNOSIS — J44.9 CHRONIC OBSTRUCTIVE PULMONARY DISEASE, UNSPECIFIED COPD TYPE: ICD-10-CM

## 2022-09-06 DIAGNOSIS — E11.65 TYPE 2 DIABETES MELLITUS WITH HYPERGLYCEMIA, WITHOUT LONG-TERM CURRENT USE OF INSULIN: Primary | ICD-10-CM

## 2022-09-06 DIAGNOSIS — N18.31 CHRONIC KIDNEY DISEASE, STAGE 3A: ICD-10-CM

## 2022-09-06 DIAGNOSIS — C50.412 MALIGNANT NEOPLASM OF UPPER-OUTER QUADRANT OF LEFT FEMALE BREAST, UNSPECIFIED ESTROGEN RECEPTOR STATUS: ICD-10-CM

## 2022-09-06 DIAGNOSIS — K21.9 GASTROESOPHAGEAL REFLUX DISEASE, UNSPECIFIED WHETHER ESOPHAGITIS PRESENT: ICD-10-CM

## 2022-09-06 PROCEDURE — 3061F NEG MICROALBUMINURIA REV: CPT | Mod: CPTII,,, | Performed by: STUDENT IN AN ORGANIZED HEALTH CARE EDUCATION/TRAINING PROGRAM

## 2022-09-06 PROCEDURE — 3288F FALL RISK ASSESSMENT DOCD: CPT | Mod: CPTII,,, | Performed by: STUDENT IN AN ORGANIZED HEALTH CARE EDUCATION/TRAINING PROGRAM

## 2022-09-06 PROCEDURE — 1101F PR PT FALLS ASSESS DOC 0-1 FALLS W/OUT INJ PAST YR: ICD-10-PCS | Mod: CPTII,,, | Performed by: STUDENT IN AN ORGANIZED HEALTH CARE EDUCATION/TRAINING PROGRAM

## 2022-09-06 PROCEDURE — 3078F DIAST BP <80 MM HG: CPT | Mod: CPTII,,, | Performed by: STUDENT IN AN ORGANIZED HEALTH CARE EDUCATION/TRAINING PROGRAM

## 2022-09-06 PROCEDURE — 1159F MED LIST DOCD IN RCRD: CPT | Mod: CPTII,,, | Performed by: STUDENT IN AN ORGANIZED HEALTH CARE EDUCATION/TRAINING PROGRAM

## 2022-09-06 PROCEDURE — 3008F PR BODY MASS INDEX (BMI) DOCUMENTED: ICD-10-PCS | Mod: CPTII,,, | Performed by: STUDENT IN AN ORGANIZED HEALTH CARE EDUCATION/TRAINING PROGRAM

## 2022-09-06 PROCEDURE — 1126F AMNT PAIN NOTED NONE PRSNT: CPT | Mod: CPTII,,, | Performed by: STUDENT IN AN ORGANIZED HEALTH CARE EDUCATION/TRAINING PROGRAM

## 2022-09-06 PROCEDURE — 3075F SYST BP GE 130 - 139MM HG: CPT | Mod: CPTII,,, | Performed by: STUDENT IN AN ORGANIZED HEALTH CARE EDUCATION/TRAINING PROGRAM

## 2022-09-06 PROCEDURE — 3075F PR MOST RECENT SYSTOLIC BLOOD PRESS GE 130-139MM HG: ICD-10-PCS | Mod: CPTII,,, | Performed by: STUDENT IN AN ORGANIZED HEALTH CARE EDUCATION/TRAINING PROGRAM

## 2022-09-06 PROCEDURE — 3288F PR FALLS RISK ASSESSMENT DOCUMENTED: ICD-10-PCS | Mod: CPTII,,, | Performed by: STUDENT IN AN ORGANIZED HEALTH CARE EDUCATION/TRAINING PROGRAM

## 2022-09-06 PROCEDURE — 3008F BODY MASS INDEX DOCD: CPT | Mod: CPTII,,, | Performed by: STUDENT IN AN ORGANIZED HEALTH CARE EDUCATION/TRAINING PROGRAM

## 2022-09-06 PROCEDURE — G0439 PR MEDICARE ANNUAL WELLNESS SUBSEQUENT VISIT: ICD-10-PCS | Mod: ,,, | Performed by: STUDENT IN AN ORGANIZED HEALTH CARE EDUCATION/TRAINING PROGRAM

## 2022-09-06 PROCEDURE — 3078F PR MOST RECENT DIASTOLIC BLOOD PRESSURE < 80 MM HG: ICD-10-PCS | Mod: CPTII,,, | Performed by: STUDENT IN AN ORGANIZED HEALTH CARE EDUCATION/TRAINING PROGRAM

## 2022-09-06 PROCEDURE — 1159F PR MEDICATION LIST DOCUMENTED IN MEDICAL RECORD: ICD-10-PCS | Mod: CPTII,,, | Performed by: STUDENT IN AN ORGANIZED HEALTH CARE EDUCATION/TRAINING PROGRAM

## 2022-09-06 PROCEDURE — 1126F PR PAIN SEVERITY QUANTIFIED, NO PAIN PRESENT: ICD-10-PCS | Mod: CPTII,,, | Performed by: STUDENT IN AN ORGANIZED HEALTH CARE EDUCATION/TRAINING PROGRAM

## 2022-09-06 PROCEDURE — G0439 PPPS, SUBSEQ VISIT: HCPCS | Mod: ,,, | Performed by: STUDENT IN AN ORGANIZED HEALTH CARE EDUCATION/TRAINING PROGRAM

## 2022-09-06 PROCEDURE — 3066F PR DOCUMENTATION OF TREATMENT FOR NEPHROPATHY: ICD-10-PCS | Mod: CPTII,,, | Performed by: STUDENT IN AN ORGANIZED HEALTH CARE EDUCATION/TRAINING PROGRAM

## 2022-09-06 PROCEDURE — 1101F PT FALLS ASSESS-DOCD LE1/YR: CPT | Mod: CPTII,,, | Performed by: STUDENT IN AN ORGANIZED HEALTH CARE EDUCATION/TRAINING PROGRAM

## 2022-09-06 PROCEDURE — 3061F PR NEG MICROALBUMINURIA RESULT DOCUMENTED/REVIEW: ICD-10-PCS | Mod: CPTII,,, | Performed by: STUDENT IN AN ORGANIZED HEALTH CARE EDUCATION/TRAINING PROGRAM

## 2022-09-06 PROCEDURE — 3066F NEPHROPATHY DOC TX: CPT | Mod: CPTII,,, | Performed by: STUDENT IN AN ORGANIZED HEALTH CARE EDUCATION/TRAINING PROGRAM

## 2022-09-06 RX ORDER — DICLOFENAC SODIUM 75 MG/1
75 TABLET, DELAYED RELEASE ORAL 2 TIMES DAILY
COMMUNITY
Start: 2022-08-18 | End: 2022-10-26

## 2022-09-06 NOTE — PROGRESS NOTES
Subjective:      Ileana Mathews  09/06/2022  [id number]    Josey Ruelas MD  Patient Care Team:  Josey Vale MD as PCP - General (Internal Medicine)          Visit Type:a scheduled routine follow-up visit    Chief Complaint: Medicare AWV    HPI  Ms Tejeda presents for Medicare Wellness. Patient does not have new complaints. Has chronic neck pain, follows with Ortho. Doing well. Has history of diabetes, follows with Dr Gilbert.     Past Medical History:   Diagnosis Date    Anxiety/depression     Arthritis     Breast cancer     COPD (chronic obstructive pulmonary disease)     Diabetes mellitus     GERD (gastroesophageal reflux disease)     Hyperlipidemia     Hypertension     Panic attacks     SVT (supraventricular tachycardia)      Past Surgical History:   Procedure Laterality Date    BLADDER SUSPENSION      HYSTERECTOMY      BSO    Mediport placement and removal      PNEUMONECTOMY Left     RHINOPLASTY      TONSILLECTOMY       Family History   Problem Relation Age of Onset    Breast cancer Mother         bilateral    Lung cancer Paternal Grandmother      Social History     Tobacco Use    Smoking status: Former     Packs/day: 2.50     Years: 40.00     Pack years: 100.00     Types: Cigarettes     Quit date: 11/1/2018     Years since quitting: 3.8    Smokeless tobacco: Never   Substance and Sexual Activity    Alcohol use: Yes    Drug use: Not on file    Sexual activity: Not on file     Active Problem List with Overview Notes    Diagnosis Date Noted    Type 2 diabetes mellitus with hyperglycemia, without long-term current use of insulin 09/06/2022    Primary pulmonary hypertension 09/06/2022    Supraventricular tachycardia 09/06/2022    Chronic obstructive pulmonary disease, unspecified COPD type 09/06/2022    Chronic kidney disease, stage 3a 09/06/2022    Gastroesophageal reflux disease 09/06/2022    Hypertension 09/06/2022    Medicare annual wellness visit, subsequent 09/06/2022    Malignant neoplasm  of upper-outer quadrant of left female breast 2022    Secondary malignant neoplasm of left lung 2022     Review of patient's allergies indicates:  No Known Allergies    The following were reviewed at this visit: active problem list, medication list, allergies, family history, social history, and health maintenance.    Medications:    Current Outpatient Medications:     albuterol (PROVENTIL/VENTOLIN HFA) 90 mcg/actuation inhaler, SMARTSI Puff(s) Via Inhaler Every 6 Hours, Disp: , Rfl:     amLODIPine (NORVASC) 5 MG tablet, Take 1 tablet by mouth once daily., Disp: , Rfl:     anastrozole (ARIMIDEX) 1 mg Tab, TAKE 1 TABLET BY MOUTH ONCE DAILY, Disp: 30 tablet, Rfl: 6    ANORO ELLIPTA 62.5-25 mcg/actuation DsDv, Inhale 1 puff into the lungs once daily., Disp: , Rfl:     azelastine-fluticasone (DYMISTA) 137-50 mcg/spray Spry nassal spray, 1 spray by Each Nostril route 2 (two) times daily., Disp: , Rfl:     b complex vitamins capsule, Take 1 capsule by mouth once daily., Disp: , Rfl:     diclofenac (VOLTAREN) 75 MG EC tablet, Take 75 mg by mouth 2 (two) times daily., Disp: , Rfl:     esomeprazole (NEXIUM) 40 MG capsule, Take 40 mg by mouth once daily., Disp: , Rfl:     FARXIGA 10 mg tablet, Take 10 mg by mouth once daily., Disp: , Rfl:     glimepiride (AMARYL) 4 MG tablet, Take 4 mg by mouth once daily., Disp: , Rfl:     LINZESS 72 mcg Cap capsule, TAKE 1 CAPSULE ORAL DAILY INSTR:DO NOT CRUSH OR CHEW, Disp: 30 capsule, Rfl: 1    omega-3 fatty acids/fish oil (FISH OIL-OMEGA-3 FATTY ACIDS) 300-1,000 mg capsule, Take 1 g by mouth once daily., Disp: , Rfl:     pregabalin (LYRICA) 50 MG capsule, TAKE ONE CAPSULE BY MOUTH TWO TIMES A DAY EARLY REFILL DUE TO TRAVEL 22 LWS, Disp: 60 capsule, Rfl: 5    TRULICITY 1.5 mg/0.5 mL pen injector, Inject 1.5 mg into the skin every 7 days., Disp: , Rfl:       Medications have been reviewed and reconciled with patient at this visit.  Barriers to medications reviewed with  patient.    Adverse reactions to current medications reviewed with patient..    Over the counter medications reviewed and reconciled with patient.    Opioid Screening: Patient medication list reviewed, patient is not taking prescription opioids. Patient is not using additional opioids than prescribed. Patient is at low risk of substance abuse based on this opioid use history.     Review of Systems   Constitutional:  Negative for chills, fever, malaise/fatigue and weight loss.   HENT:  Negative for congestion, ear discharge, ear pain, hearing loss, sinus pain and sore throat.    Eyes:  Negative for photophobia, pain, discharge and redness.   Respiratory:  Negative for cough, shortness of breath and wheezing.    Cardiovascular:  Negative for chest pain, palpitations and leg swelling.   Gastrointestinal:  Negative for constipation, diarrhea, heartburn, nausea and vomiting.   Genitourinary:  Negative for dysuria, frequency and urgency.   Musculoskeletal:  Negative for falls, joint pain and myalgias.   Skin:  Negative for itching and rash.   Neurological:  Negative for dizziness, focal weakness, weakness and headaches.   Psychiatric/Behavioral:  Negative for depression and memory loss. The patient is not nervous/anxious and does not have insomnia.      What [age]  Are you male or female?: Female  During the past four weeks, how much have you been bothered by emotional problems such as feeling anxious, depressed, irritable, sad, or downhearted and blue?: Slightly  During the past five weeks, has your physical and/or emotional health limited your social activities with family, friends, neighbors, or groups?: Not at all  During the past four weeks, how much bodily pain have you generally had?: No pain  During the past four weeks, was someone available to help if you needed and wanted help?: Yes, quite a bit  During the past four weeks, what was the hardest physical activity you could do for at least two  minutes?: Moderate  Can you get to places out of walking distance without help?  (For example, can you travel alone on buses or taxis, or drive your own car?): Yes  Can you go shopping for groceries or clothes without someone's help?: Yes  Can you prepare your own meals?: Yes  Can you do your own housework without help?: Yes  Because of any health problems, do you need the help of another person with your personal care needs such as eating, bathing, dressing, or getting around the house?: No  Can you handle your own money without help?: Yes  During the past four weeks, how would you rate your health in general?: Good  How have things been going for you during the past four weeks?: Pretty well  Are you having difficulties driving your car?: No  Do you always fasten your seat belt when you are in a car?: Yes, usually  How often in the past four weeks have you been bothered by falling or dizzy when standing up?: Never  How often in the past four weeks have you been bothered by sexual problems?: Never  How often in the past four weeks have you been bothered by trouble eating well?: Never  How often in the past four weeks have you been bothered by teeth or denture problems?: Never  How often in the past four weeks have you been bothered with problems using the telephone?: Never  How often in the past four weeks have you been bothered by tiredness or fatigue?: Never  Have you fallen two or more times in the past year?: No  Are you afraid of falling?: No  Are you a smoker?: No  During the past four weeks, how many drinks of wine, beer, or other alcoholic beverages did you have?: No alcohol at all  Do you exercise for about 20 minutes three or more days a week?: Yes, most of the time  Have you been given any information to help you with hazards in your house that might hurt you?: Yes  Have you been given any information to help you with keeping track of your medications?: Yes  How often do you have trouble taking medicines  "the way you've been told to take them?: I always take them as prescribed  How confident are you that you can control and manage most of your health problems?: Very confident  What is your race? (Check all that apply.):           Objective:      Vitals:    09/06/22 1355   BP: 132/70   BP Location: Right arm   Pulse: 68   Resp: 18   SpO2: 98%   Weight: 64.4 kg (142 lb)   Height: 5' 3" (1.6 m)       Physical Exam  Constitutional:       General: She is not in acute distress.     Appearance: Normal appearance.   HENT:      Head: Normocephalic and atraumatic.   Eyes:      Extraocular Movements: Extraocular movements intact.      Pupils: Pupils are equal, round, and reactive to light.   Cardiovascular:      Rate and Rhythm: Normal rate and regular rhythm.      Pulses: Normal pulses.      Heart sounds: Normal heart sounds. No murmur heard.    No friction rub. No gallop.   Pulmonary:      Effort: Pulmonary effort is normal.      Breath sounds: Normal breath sounds. No wheezing, rhonchi or rales.   Abdominal:      General: Abdomen is flat. Bowel sounds are normal. There is no distension.      Palpations: Abdomen is soft.      Tenderness: There is no abdominal tenderness.   Musculoskeletal:         General: No swelling or tenderness. Normal range of motion.      Cervical back: Normal range of motion and neck supple. No tenderness.      Right lower leg: No edema.      Left lower leg: No edema.   Lymphadenopathy:      Cervical: No cervical adenopathy.   Skin:     Findings: No lesion or rash.   Neurological:      General: No focal deficit present.      Mental Status: She is alert and oriented to person, place, and time.      Cranial Nerves: No cranial nerve deficit.      Sensory: No sensory deficit.      Motor: No weakness.   Psychiatric:         Mood and Affect: Mood normal.         Behavior: Behavior normal.         Thought Content: Thought content normal.       No flowsheet data found.  Fall Risk Assessment - " Outpatient 9/6/2022 7/25/2022   Mobility Status Ambulatory Ambulatory   Number of falls 0 0   Identified as fall risk 0 0                 Depression Screening  Over the past two weeks, has the patient felt down, depressed, or hopeless?: No  Over the past two weeks, has the patient felt little interest or pleasure in doing things?: No  Functional Ability/Safety Screening  Was the patient's timed Up & Go test unsteady or longer than 30 seconds?: No  Does the patient need help with phone, transportation, shopping, preparing meals, housework, laundry, meds, or managing money?: No  Does the patient's home have rugs in the hallway, lack grab bars in the bathroom, lack handrails on the stairs or have poor lighting?: No  Have you noticed any hearing difficulties?: No  Cognitive Function (Assessed through direct observation with due consideration of information obtained by way of patient reports and/or concerns raised by family, friends, caretakers, or others)    Does the patient repeat questions/statements in the same day?: No  Does the patient have trouble remembering the date, year, and time?: No  Does the patient have difficulty managing finances?: No  Does the patient have a decreased sense of direction?: No        Laboratory Reviewed ({Yes)  Lab Results   Component Value Date    WBC 9.9 05/27/2022    HGB 13.5 05/27/2022    HCT 42.9 05/27/2022     05/27/2022    ALT 7 05/27/2022    AST 12 05/27/2022     05/27/2022    K 4.2 05/27/2022    CREATININE 1.22 (H) 05/27/2022    BUN 18.1 05/27/2022    CO2 26 05/27/2022         Assessment and Plan:       Problem List Items Addressed This Visit          Pulmonary    Primary pulmonary hypertension    Chronic obstructive pulmonary disease, unspecified COPD type     Continue current medications             Cardiac/Vascular    Supraventricular tachycardia    Hypertension     Controlled  Continue current medications             Renal/    Chronic kidney disease, stage 3a        Oncology    Malignant neoplasm of upper-outer quadrant of left female breast     On anastrozole  Follows with Oncology             Endocrine    Type 2 diabetes mellitus with hyperglycemia, without long-term current use of insulin - Primary     Follows with Dr Gilbert  Per patient last A1C of 6.0  Up to date with eye exam and foot exam  Continue current medications             GI    Gastroesophageal reflux disease     Stable  Continue current medication            Other    Medicare annual wellness visit, subsequent     Mammogram: up to date  DXA scan: up to date  Colon cancer screening: did FIT test earlier this year, will check records  Pneumonia vaccine: up to date  Singrix vaccine: up to date                Care Plan/Goals: Reviewed    Goals    None         Follow up: Follow up in about 6 months (around 3/6/2023) for Bp follow up.    No orders of the defined types were placed in this encounter.      Medicare Annual Wellness and Personalized Prevention Plan:   Fall Risk + Home Safety + Hearing Impairment + Depression Screen + Cognitive Impairment Screen + Health Risk Assessment all reviewed.     Health Maintenance Topics with due status: Not Due       Topic Last Completion Date    TETANUS VACCINE 10/04/2017    DEXA Scan 10/15/2020    Lipid Panel 11/02/2020      The patient's Health Maintenance was reviewed and the following appears to be due at this time:   Health Maintenance Due   Topic Date Due    Hepatitis C Screening  Never done    High Dose Statin  Never done    Colorectal Cancer Screening  Never done    LDCT Lung Screen  07/07/2021    Influenza Vaccine (1) 09/01/2022       Advance Care Planning   I attest to discussing Advance Care Planning with patient and/or family member.  Education was provided including the importance of the Health Care Power of , Advance Directives, and/or [place] documentation.  The patient expressed understanding to the importance of this information and discussion.

## 2022-09-07 NOTE — ASSESSMENT & PLAN NOTE
Follows with Dr Gilbert  Per patient last A1C of 6.0  Up to date with eye exam and foot exam  Continue current medications

## 2022-09-07 NOTE — ASSESSMENT & PLAN NOTE
Mammogram: up to date  DXA scan: up to date  Colon cancer screening: did FIT test earlier this year, will check records  Pneumonia vaccine: up to date  Singrix vaccine: up to date

## 2022-09-21 ENCOUNTER — HISTORICAL (OUTPATIENT)
Dept: ADMINISTRATIVE | Facility: HOSPITAL | Age: 72
End: 2022-09-21
Payer: MEDICARE

## 2022-09-29 ENCOUNTER — LAB VISIT (OUTPATIENT)
Dept: LAB | Facility: HOSPITAL | Age: 72
End: 2022-09-29
Attending: NURSE PRACTITIONER
Payer: MEDICARE

## 2022-09-29 DIAGNOSIS — C50.412 MALIGNANT NEOPLASM OF UPPER-OUTER QUADRANT OF LEFT FEMALE BREAST, UNSPECIFIED ESTROGEN RECEPTOR STATUS: ICD-10-CM

## 2022-09-29 DIAGNOSIS — C78.02 SECONDARY MALIGNANT NEOPLASM OF LEFT LUNG: ICD-10-CM

## 2022-09-29 LAB
ALBUMIN SERPL-MCNC: 4 GM/DL (ref 3.4–4.8)
ALBUMIN/GLOB SERPL: 1.2 RATIO (ref 1.1–2)
ALP SERPL-CCNC: 107 UNIT/L (ref 40–150)
ALT SERPL-CCNC: 7 UNIT/L (ref 0–55)
AST SERPL-CCNC: 13 UNIT/L (ref 5–34)
BASOPHILS # BLD AUTO: 0.06 X10(3)/MCL (ref 0–0.2)
BASOPHILS NFR BLD AUTO: 0.6 %
BILIRUBIN DIRECT+TOT PNL SERPL-MCNC: 0.4 MG/DL
BUN SERPL-MCNC: 22.9 MG/DL (ref 9.8–20.1)
CALCIUM SERPL-MCNC: 9.9 MG/DL (ref 8.4–10.2)
CEA SERPL-MCNC: <1.73 NG/ML (ref 0–3)
CHLORIDE SERPL-SCNC: 106 MMOL/L (ref 98–107)
CO2 SERPL-SCNC: 25 MMOL/L (ref 23–31)
CREAT SERPL-MCNC: 0.83 MG/DL (ref 0.55–1.02)
EOSINOPHIL # BLD AUTO: 0.25 X10(3)/MCL (ref 0–0.9)
EOSINOPHIL NFR BLD AUTO: 2.5 %
ERYTHROCYTE [DISTWIDTH] IN BLOOD BY AUTOMATED COUNT: 14.7 % (ref 11.5–17)
GFR SERPLBLD CREATININE-BSD FMLA CKD-EPI: >60 MLS/MIN/1.73/M2
GLOBULIN SER-MCNC: 3.3 GM/DL (ref 2.4–3.5)
GLUCOSE SERPL-MCNC: 129 MG/DL (ref 82–115)
HCT VFR BLD AUTO: 42.1 % (ref 37–47)
HGB BLD-MCNC: 13.1 GM/DL (ref 12–16)
IMM GRANULOCYTES # BLD AUTO: 0.02 X10(3)/MCL (ref 0–0.04)
IMM GRANULOCYTES NFR BLD AUTO: 0.2 %
LYMPHOCYTES # BLD AUTO: 1.73 X10(3)/MCL (ref 0.6–4.6)
LYMPHOCYTES NFR BLD AUTO: 17 %
MCH RBC QN AUTO: 27.7 PG (ref 27–31)
MCHC RBC AUTO-ENTMCNC: 31.1 MG/DL (ref 33–36)
MCV RBC AUTO: 89 FL (ref 80–94)
MONOCYTES # BLD AUTO: 0.63 X10(3)/MCL (ref 0.1–1.3)
MONOCYTES NFR BLD AUTO: 6.2 %
NEUTROPHILS # BLD AUTO: 7.5 X10(3)/MCL (ref 2.1–9.2)
NEUTROPHILS NFR BLD AUTO: 73.5 %
PLATELET # BLD AUTO: 284 X10(3)/MCL (ref 130–400)
PMV BLD AUTO: 10.5 FL (ref 7.4–10.4)
POTASSIUM SERPL-SCNC: 4.3 MMOL/L (ref 3.5–5.1)
PROT SERPL-MCNC: 7.3 GM/DL (ref 5.8–7.6)
RBC # BLD AUTO: 4.73 X10(6)/MCL (ref 4.2–5.4)
SODIUM SERPL-SCNC: 139 MMOL/L (ref 136–145)
WBC # SPEC AUTO: 10.2 X10(3)/MCL (ref 4.5–11.5)

## 2022-09-29 PROCEDURE — 36415 COLL VENOUS BLD VENIPUNCTURE: CPT

## 2022-09-29 PROCEDURE — 86300 IMMUNOASSAY TUMOR CA 15-3: CPT

## 2022-09-29 PROCEDURE — 85025 COMPLETE CBC W/AUTO DIFF WBC: CPT

## 2022-09-29 PROCEDURE — 80053 COMPREHEN METABOLIC PANEL: CPT

## 2022-09-29 PROCEDURE — 82378 CARCINOEMBRYONIC ANTIGEN: CPT

## 2022-10-03 LAB — CANCER AG27-29 SERPL-ACNC: 26.3 U/ML

## 2022-10-26 ENCOUNTER — OFFICE VISIT (OUTPATIENT)
Dept: HEMATOLOGY/ONCOLOGY | Facility: CLINIC | Age: 72
End: 2022-10-26
Payer: MEDICARE

## 2022-10-26 VITALS
HEART RATE: 81 BPM | WEIGHT: 143 LBS | OXYGEN SATURATION: 99 % | BODY MASS INDEX: 25.34 KG/M2 | SYSTOLIC BLOOD PRESSURE: 123 MMHG | RESPIRATION RATE: 17 BRPM | DIASTOLIC BLOOD PRESSURE: 80 MMHG | TEMPERATURE: 97 F | HEIGHT: 63 IN

## 2022-10-26 DIAGNOSIS — M54.2 NECK PAIN: ICD-10-CM

## 2022-10-26 DIAGNOSIS — C50.412 MALIGNANT NEOPLASM OF UPPER-OUTER QUADRANT OF LEFT FEMALE BREAST, UNSPECIFIED ESTROGEN RECEPTOR STATUS: Primary | ICD-10-CM

## 2022-10-26 DIAGNOSIS — C78.02 SECONDARY MALIGNANT NEOPLASM OF LEFT LUNG: ICD-10-CM

## 2022-10-26 DIAGNOSIS — Z78.0 POSTMENOPAUSAL: ICD-10-CM

## 2022-10-26 DIAGNOSIS — M85.80 OSTEOPENIA, UNSPECIFIED LOCATION: ICD-10-CM

## 2022-10-26 PROCEDURE — 1101F PT FALLS ASSESS-DOCD LE1/YR: CPT | Mod: CPTII,S$GLB,, | Performed by: NURSE PRACTITIONER

## 2022-10-26 PROCEDURE — 1126F PR PAIN SEVERITY QUANTIFIED, NO PAIN PRESENT: ICD-10-PCS | Mod: CPTII,S$GLB,, | Performed by: NURSE PRACTITIONER

## 2022-10-26 PROCEDURE — 3061F NEG MICROALBUMINURIA REV: CPT | Mod: CPTII,S$GLB,, | Performed by: NURSE PRACTITIONER

## 2022-10-26 PROCEDURE — 3066F NEPHROPATHY DOC TX: CPT | Mod: CPTII,S$GLB,, | Performed by: NURSE PRACTITIONER

## 2022-10-26 PROCEDURE — 1126F AMNT PAIN NOTED NONE PRSNT: CPT | Mod: CPTII,S$GLB,, | Performed by: NURSE PRACTITIONER

## 2022-10-26 PROCEDURE — 3288F PR FALLS RISK ASSESSMENT DOCUMENTED: ICD-10-PCS | Mod: CPTII,S$GLB,, | Performed by: NURSE PRACTITIONER

## 2022-10-26 PROCEDURE — 3074F SYST BP LT 130 MM HG: CPT | Mod: CPTII,S$GLB,, | Performed by: NURSE PRACTITIONER

## 2022-10-26 PROCEDURE — 1160F RVW MEDS BY RX/DR IN RCRD: CPT | Mod: CPTII,S$GLB,, | Performed by: NURSE PRACTITIONER

## 2022-10-26 PROCEDURE — 3061F PR NEG MICROALBUMINURIA RESULT DOCUMENTED/REVIEW: ICD-10-PCS | Mod: CPTII,S$GLB,, | Performed by: NURSE PRACTITIONER

## 2022-10-26 PROCEDURE — 1159F MED LIST DOCD IN RCRD: CPT | Mod: CPTII,S$GLB,, | Performed by: NURSE PRACTITIONER

## 2022-10-26 PROCEDURE — 99999 PR PBB SHADOW E&M-EST. PATIENT-LVL V: ICD-10-PCS | Mod: PBBFAC,,, | Performed by: NURSE PRACTITIONER

## 2022-10-26 PROCEDURE — 3079F PR MOST RECENT DIASTOLIC BLOOD PRESSURE 80-89 MM HG: ICD-10-PCS | Mod: CPTII,S$GLB,, | Performed by: NURSE PRACTITIONER

## 2022-10-26 PROCEDURE — 3079F DIAST BP 80-89 MM HG: CPT | Mod: CPTII,S$GLB,, | Performed by: NURSE PRACTITIONER

## 2022-10-26 PROCEDURE — 99999 PR PBB SHADOW E&M-EST. PATIENT-LVL V: CPT | Mod: PBBFAC,,, | Performed by: NURSE PRACTITIONER

## 2022-10-26 PROCEDURE — 1101F PR PT FALLS ASSESS DOC 0-1 FALLS W/OUT INJ PAST YR: ICD-10-PCS | Mod: CPTII,S$GLB,, | Performed by: NURSE PRACTITIONER

## 2022-10-26 PROCEDURE — 3066F PR DOCUMENTATION OF TREATMENT FOR NEPHROPATHY: ICD-10-PCS | Mod: CPTII,S$GLB,, | Performed by: NURSE PRACTITIONER

## 2022-10-26 PROCEDURE — 1160F PR REVIEW ALL MEDS BY PRESCRIBER/CLIN PHARMACIST DOCUMENTED: ICD-10-PCS | Mod: CPTII,S$GLB,, | Performed by: NURSE PRACTITIONER

## 2022-10-26 PROCEDURE — 3288F FALL RISK ASSESSMENT DOCD: CPT | Mod: CPTII,S$GLB,, | Performed by: NURSE PRACTITIONER

## 2022-10-26 PROCEDURE — 3074F PR MOST RECENT SYSTOLIC BLOOD PRESSURE < 130 MM HG: ICD-10-PCS | Mod: CPTII,S$GLB,, | Performed by: NURSE PRACTITIONER

## 2022-10-26 PROCEDURE — 99214 PR OFFICE/OUTPT VISIT, EST, LEVL IV, 30-39 MIN: ICD-10-PCS | Mod: S$GLB,,, | Performed by: NURSE PRACTITIONER

## 2022-10-26 PROCEDURE — 1159F PR MEDICATION LIST DOCUMENTED IN MEDICAL RECORD: ICD-10-PCS | Mod: CPTII,S$GLB,, | Performed by: NURSE PRACTITIONER

## 2022-10-26 PROCEDURE — 99214 OFFICE O/P EST MOD 30 MIN: CPT | Mod: S$GLB,,, | Performed by: NURSE PRACTITIONER

## 2022-10-26 RX ORDER — CYCLOBENZAPRINE HCL 5 MG
5 TABLET ORAL NIGHTLY PRN
Qty: 10 TABLET | Refills: 0 | Status: SHIPPED | OUTPATIENT
Start: 2022-10-26 | End: 2022-11-05

## 2022-10-26 NOTE — PROGRESS NOTES
Subjective:       Patient ID: Ileana Mathews is a 72 y.o. female.    Chief Complaint: Neck and shoulder pain    Diagnosis: Recurrent metastatic breast cancer - ER low +/WY -, HER-2 negative                   Solitary left lung met s/p left pneumonectomy 11/18                  Postmenopausal s/p hysterectomy      + COVID-19 vaccination    Current Treatment: Arimidex 1 mg daily (restarted 11/18)  Treatment History: Neoadjuvant TC x4 2008 --> B mastectomies --> TC x4 --> XRT --> Arimidex x 7 yrs    Clinical History:  Patient was initially diagnosed with a stage III left breast cancer in 2008 with 2 out of 27 positive axillary lymph nodes. She was treated by Dr. Donald Foss in [place]. Her pathology was mixed ductal and lobular - ER +88%, WY +26% and HER-2 negative ([facility]). She underwent neoadjuvant chemotherapy with 4 cycles of TC followed by bilateral mastectomies and 4 more cycles of TC. After completion of chemotherapy, she received adjuvant radiation therapy followed by Arimidex for 7 years. She transitioned to the [facility] 6/16 with Dr. Yan. Ongoing observation was recommended. A solitary pulmonary nodule was discovered on surveillance imaging in the left lung in 2018. Follow-up scan showed increased size of the nodule with no other findings of metastatic disease. PET scan showed no additional suspicious areas. She was referred to CV surgery for a planned wedge resection of the pulmonary nodule. Due to complications at the time of surgery, she required a left pneumonectomy. I do not have a copy of the operative report, but the patient stated there were bleeding complications due to a vascular injury. Surgical pathology showed a metastatic adenocarcinoma consistent with breast primary measuring 1.1 cm. Cells were positive for CK7, MIREYA-3, GCDFP15 and mammoglobin and negative for TTF-1, CK 20, CDX2 and PAX-8. 10 resected lymph nodes were negative for involvement. Tumor was low ER +1.8%, WY  negative and HER-2 negative ([facility]). She was placed back on Arimidex 1 mg daily.    Surveillance CT of the chest 12/5/19 ([facility]) showed postoperative changes from previous left pneumonectomy and no findings suspicious for metastatic disease including the visualized upper abdomen. She was seen in the ER for lower abdominal pain 2/1/20. CT scan of the abdomen and pelvis without contrast showed a large amount of stool in the colon and rectum, cholelithiasis and a left renal cyst. CT scan of the chest 2/27/20 noted a 3 mm nodule in the right anterior lung which on retrospect was present and stable on the previous CT scan. Left pneumonectomy changes were stable with mediastinal shift. There were no other lesions suspicious for metastatic disease.    She was seen as a new patient at [facility] 5/11/20 to establish ongoing Oncology care and follow-up. She was still taking Arimidex 1 mg daily. She had no significant side effects related to the treatment. She was continued on Arimidex. Bone density exam 10/15/20 showed osteopenia of the AP spine, left and right hip with T scores of -1.0, -2.0 and -2.0 respectively.      CT scans of the chest, abdomen and pelvis 8/23/21 showed changes from her prior pneumonectomy with a small left pleural fluid collection and chronic emphysematous changes in the right lung.  There was a small, stable RUL nodule unchanged from previous exams.  There was no evidence of metastatic disease in the abdomen or pelvis.     CT scans of the chest, abdomen and pelvis 2/16/22 ([facility]) showed stable changes from prior left pneumonectomy and a subcentimeter triangular focus in the right upper lobe unchanged from her previous exams.       Interval History   Patient is here today by herself for a scheduled four month follow-up visit.  She is taking her Arimidex daily as directed.  She is currently participating in PT for neck and shoulder pain with decreased ROM.  She was referred by  Neurosurgery after MRI studies were performed.  She is still having significant pain, particularly at night and has trouble sleeping.  I have requested records for our review.  She also noted a rash on her right chest wall this morning.  She denies any injuries, etc.  The rash is bright red and excoriated.  There are no associated nodules.  Laboratory for this visit was unremarkable, including LFTs and tumor markers.  Findings were discussed today with patient.      Review of patient's allergies indicates:  No Known Allergies   Review of Systems   Constitutional:  Positive for fatigue.   HENT: Negative.     Eyes: Negative.    Respiratory:  Positive for shortness of breath (URIBE).    Cardiovascular: Negative.    Gastrointestinal: Negative.    Endocrine: Negative.    Genitourinary: Negative.    Musculoskeletal:  Positive for arthralgias, back pain, neck pain and neck stiffness.   Integumentary:  Positive for rash (near right mastectomy incision).   Allergic/Immunologic: Negative.    Neurological: Negative.    Hematological:  Negative for adenopathy.   Psychiatric/Behavioral: Negative.         PMHx:  HTN, hyperlipidemia, diabetes mellitus, SVT, COPD, GERD, arthritis, anxiety/depression, panic attacks    PSHx:  Tonsils, bladder suspension, hysterectomy/BSO, rhinoplasty, Mediport placement and removal, left pneumonectomy    SH:  Former smoker 1-2.5 PPD x 40 yrs, quit 11/18. Rare alcohol use. Lives in [place] with her , retired dispatcher for the [employer]    FH:  Her mother had bilateral breast cancer, paternal grandmother had lung cancer.        Objective:     Vitals:    10/26/22 1324   BP: 123/80   Pulse: 81   Resp: 17   Temp: 97.3 °F (36.3 °C)         Physical Exam  Constitutional:       Appearance: Normal appearance.   HENT:      Head: Normocephalic.      Mouth/Throat:      Mouth: Mucous membranes are dry.   Eyes:      General: No scleral icterus.     Pupils: Pupils are equal, round, and reactive to  light.   Cardiovascular:      Rate and Rhythm: Normal rate and regular rhythm.   Pulmonary:      Comments: Absent breath sounds on the left, clear on the right  Chest:      Chest wall: No tenderness.   Breasts:     Right: No mass or skin change.      Left: No mass or skin change.      Comments: Bilateral mastectomies.  Bright red, excoriated rash right chest wall measuring 2-3 cm  Abdominal:      General: Bowel sounds are normal.      Palpations: Abdomen is soft.   Musculoskeletal:         General: Tenderness (Shoulders, neck) present. No swelling.      Cervical back: Normal range of motion.   Lymphadenopathy:      Cervical: No cervical adenopathy.      Upper Body:      Right upper body: No supraclavicular or axillary adenopathy.      Left upper body: No supraclavicular or axillary adenopathy.   Skin:     General: Skin is warm and dry.   Neurological:      General: No focal deficit present.      Mental Status: She is alert and oriented to person, place, and time.   Psychiatric:         Mood and Affect: Mood normal.         Behavior: Behavior normal.     ECOG SCORE    1 - Restricted in strenuous activity-ambulatory and able to carry out work of a light nature            Lab Results   Component Value Date/Time    WBC 10.2 09/29/2022 07:54 AM    RBC 4.73 09/29/2022 07:54 AM    HGB 13.1 09/29/2022 07:54 AM    HCT 42.1 09/29/2022 07:54 AM     09/29/2022 07:54 AM    ABSNEUTRO 7.5 09/29/2022 07:54 AM    NEUTROAUTO 73.5 09/29/2022 07:54 AM    ABSLYMPH 1.73 09/29/2022 07:54 AM    LYMPHAUTO 17.0 09/29/2022 07:54 AM    ABSEOS 0.25 09/29/2022 07:54 AM    EOSAUTO 2.5 09/29/2022 07:54 AM    ABSBASO 0.06 09/29/2022 07:54 AM    BASOPHILAUTO 0.6 09/29/2022 07:54 AM        Chemistry        Component Value Date/Time     09/29/2022 0754    K 4.3 09/29/2022 0754    CO2 25 09/29/2022 0754    BUN 22.9 (H) 09/29/2022 0754    CREATININE 0.83 09/29/2022 0754        Component Value Date/Time    CALCIUM 9.9 09/29/2022 0754     ALKPHOS 107 09/29/2022 0754    AST 13 09/29/2022 0754    ALT 7 09/29/2022 0754    BILITOT 0.4 09/29/2022 0754    EGFRNONAA 46 05/27/2022 1314        Assessment:   Recurrent metastatic breast cancer - initial diagnosis 2008, ER/IN +, HER-2 negative  Solitary pulmonary met s/p left pneumonectomy 11/18 - ER low +, IN negative, HER-2 negative  Postmenopausal s/p hysterectomy  Osteopenia  Neck and shoulder pain  Rash        Plan:   Continue Arimidex.  Rash and joint symptoms not suspicious for disease.  1% hydrocortisone and Aquaphor to rash.  Patient to contact me if symptoms persist and biopsy will be obtained.  Continue PT for neck and shoulder pain.  Flexeril 5 mg QHS PRN #10, 0RF.  RTC 4 months for follow-up with CBC, CMP, CEA, CA 27-29, DEXA scan and CT C/A/P.  All questions answered.      ANGEL OLSON, FNP-C    Other Physicians  Dr. Josey Gilbert

## 2022-11-09 ENCOUNTER — TELEPHONE (OUTPATIENT)
Dept: HEMATOLOGY/ONCOLOGY | Facility: CLINIC | Age: 72
End: 2022-11-09
Payer: MEDICARE

## 2022-11-16 ENCOUNTER — DOCUMENTATION ONLY (OUTPATIENT)
Dept: PRIMARY CARE CLINIC | Facility: CLINIC | Age: 72
End: 2022-11-16
Payer: MEDICARE

## 2022-12-12 PROBLEM — Z00.00 MEDICARE ANNUAL WELLNESS VISIT, SUBSEQUENT: Status: RESOLVED | Noted: 2022-09-06 | Resolved: 2022-12-12

## 2023-02-01 LAB — HBA1C MFR BLD: 6.4 % (ref 4–6)

## 2023-02-24 ENCOUNTER — HOSPITAL ENCOUNTER (OUTPATIENT)
Dept: RADIOLOGY | Facility: HOSPITAL | Age: 73
Discharge: HOME | End: 2023-02-24
Attending: NURSE PRACTITIONER
Payer: COMMERCIAL

## 2023-02-24 DIAGNOSIS — Z78.0 POSTMENOPAUSAL: ICD-10-CM

## 2023-02-24 DIAGNOSIS — M85.80 OSTEOPENIA, UNSPECIFIED LOCATION: ICD-10-CM

## 2023-02-24 PROCEDURE — 77080 DXA BONE DENSITY AXIAL: CPT | Mod: TC

## 2023-02-24 PROCEDURE — 77080 DXA BONE DENSITY AXIAL: CPT | Mod: 26,,, | Performed by: RADIOLOGY

## 2023-02-24 PROCEDURE — 77080 DXA BONE DENSITY AXIAL SKELETON 1 OR MORE SITES: ICD-10-PCS | Mod: 26,,, | Performed by: RADIOLOGY

## 2023-02-24 NOTE — PROGRESS NOTES
Subjective:       Patient ID: Ileana Mathews is a 72 y.o. female.    Chief Complaint:  I'm doing okay    Diagnosis: Recurrent metastatic breast cancer - ER low +/NH -, HER-2 negative                     Solitary left lung met s/p left pneumonectomy 11/18                    Postmenopausal s/p hysterectomy        + COVID-19 vaccinated    Treatment History  Neoadjuvant TC x4 2008 --> B mastectomies --> TC x4 --> XRT --> Arimidex x 7 yrs    Current Treatment: Arimidex 1 mg daily (restarted 11/18)    Clinical History:  Patient was initially diagnosed with a stage III left breast cancer in 2008 with 2 out of 27 positive axillary lymph nodes. She was treated by Dr. Donald Foss in [place]. Her pathology was mixed ductal and lobular - ER +88%, NH +26% and HER-2 negative ([facility]). She underwent neoadjuvant chemotherapy with 4 cycles of TC followed by bilateral mastectomies and 4 more cycles of TC. After completion of chemotherapy, she received adjuvant radiation therapy followed by Arimidex for 7 years. She transitioned to the [facility] group 6/16 with Dr. Yan. Ongoing observation was recommended. A solitary pulmonary nodule was discovered on surveillance imaging in the left lung in 2018. Follow-up scan showed increased size of the nodule with no other findings of metastatic disease. PET scan showed no additional suspicious areas. She was referred to CV surgery for a planned wedge resection of the pulmonary nodule. Due to complications at the time of surgery, she required a left pneumonectomy. I do not have a copy of the operative report, but the patient stated there were bleeding complications due to a vascular injury. Surgical pathology showed a metastatic adenocarcinoma consistent with breast primary measuring 1.1 cm. Cells were positive for CK7, MIREYA-3, GCDFP15 and mammoglobin and negative for TTF-1, CK 20, CDX2 and PAX-8. 10 resected lymph nodes were negative for involvement. Tumor was low ER +1.8%, NH  negative and HER-2 negative ([facility]). She was placed back on Arimidex 1 mg daily.    Surveillance CT of the chest 12/5/19 ([facility]) showed postoperative changes from previous left pneumonectomy and no findings suspicious for metastatic disease including the visualized upper abdomen. She was seen in the ER for lower abdominal pain 2/1/20. CT scan of the abdomen and pelvis without contrast showed a large amount of stool in the colon and rectum, cholelithiasis and a left renal cyst. CT scan of the chest 2/27/20 noted a 3 mm nodule in the right anterior lung which on retrospect was present and stable on the previous CT scan. Left pneumonectomy changes were stable with mediastinal shift. There were no other lesions suspicious for metastatic disease.    She was seen as a new patient [facility] 5[facility]11[facility]20 to establish ongoing Oncology care and follow-up. She was still taking Arimidex 1 mg daily. She had no significant side effects related to the treatment. She was continued on Arimidex. Bone density exam 10/15/20 showed osteopenia of the AP spine, left and right hip with T scores of -1.0, -2.0 and -2.0 respectively.      CT C/A/P 8/23/21 ([facility]):  Changes from prior pneumonectomy with a small left pleural fluid collection and chronic emphysematous changes in the right lung.  There was a small, stable RUL nodule unchanged from previous exams.  There was no evidence of metastatic disease in the abdomen or pelvis.   CT C/A/P 2/16/22 ([facility]):  Sable changes from prior left pneumonectomy and a subcentimeter triangular focus in the right upper lobe unchanged from her previous exams.    CT C/A/P 2/24/23 ([facility]):  Previous left pneumonectomy with hyperexpanded right lung.  Encapsulated pleural effusion left hemithorax.  Mild subpleural interstitial reticulation and scarring of the right lung and two 5 mm nodules RUL (described on previous scans).  A subcentimeter hypodensity in hepatic segment 6 was too small to  characterize.  Outside films were not available for direct comparison.     Interval History   She returns to the office today for a four-month surveillance visit accompanied by her .  She remains on hormonal therapy with Arimidex.  She is tolerating treatment well.  She has been doing physical therapy for chronic neck pain with some improvement.  No other bone pain.  Surveillance CT scans 2/24/23 were reviewed in the office.  RUL small nodules previously described and not concerning for metastatic disease.  She has had imaging at multiple different sites.  Bone density exam 2/24/23 showed osteopenia of the lumbar spine with a T-score of -1.2 and both hips with a T-score of -2.2 on the left and -2.4 on the right.  Laboratory testing showed no significant abnormalities.   is currently being treated for cirrhosis and hepatocellular carcinoma.       Review of Systems   Constitutional:  Positive for fatigue. Negative for appetite change and fever.   HENT:  Negative for mouth sores, sore throat and trouble swallowing.    Eyes: Negative.    Respiratory:  Positive for shortness of breath (URIBE). Negative for cough.    Cardiovascular:  Negative for chest pain, palpitations and leg swelling.   Gastrointestinal:  Negative for abdominal distention, abdominal pain, blood in stool, constipation, diarrhea and nausea.   Genitourinary:  Negative for dysuria, frequency and urgency.   Musculoskeletal:  Positive for arthralgias and neck pain. Negative for back pain.   Integumentary:  Negative for rash and mole/lesion.   Neurological:  Negative for dizziness, weakness, numbness and headaches.   Hematological:  Negative for adenopathy. Does not bruise/bleed easily.   Psychiatric/Behavioral: Negative.         PMHx:  HTN, hyperlipidemia, diabetes mellitus, SVT, COPD, GERD, arthritis, anxiety/depression, panic attacks  PSHx:  Tonsils, bladder suspension, hysterectomy/BSO, rhinoplasty, Mediport placement and removal, left  pneumonectomy  SH:  Former smoker 1-2.5 PPD x 40 yrs, quit 11/18. Rare alcohol use. Lives in [place] with her , retired dispatcher for the [employer]  FH:  Her mother had bilateral breast cancer, paternal grandmother had lung cancer.     Objective:     Vitals:    03/01/23 1243   BP: (!) 143/78   Pulse: 105   Resp: 18   Temp: 98 °F (36.7 °C)     Physical Exam  Constitutional:       Comments: Elderly, well-developed white female in NAD   HENT:      Head: Normocephalic.      Mouth/Throat:      Mouth: Mucous membranes are moist.      Pharynx: Oropharynx is clear. No posterior oropharyngeal erythema.   Eyes:      Extraocular Movements: Extraocular movements intact.      Conjunctiva/sclera: Conjunctivae normal.      Pupils: Pupils are equal, round, and reactive to light.   Cardiovascular:      Rate and Rhythm: Normal rate and regular rhythm.      Heart sounds: No murmur heard.  Pulmonary:      Comments: Well-healed left thoracotomy incision.  Absent breath sounds on the left, clear on the right.  Chest:   Breasts:     Right: No mass or skin change.      Left: No mass or skin change.      Comments: Bilateral mastectomies.  Bright red, excoriated rash right chest wall measuring 2-3 cm  Abdominal:      General: Bowel sounds are normal. There is no distension.      Palpations: Abdomen is soft.      Tenderness: There is no abdominal tenderness.   Musculoskeletal:         General: No swelling or tenderness. Normal range of motion.      Cervical back: Neck supple. No tenderness.   Lymphadenopathy:      Upper Body:      Right upper body: No supraclavicular or axillary adenopathy.      Left upper body: No supraclavicular or axillary adenopathy.   Skin:     General: Skin is warm and dry.      Findings: No rash.   Neurological:      General: No focal deficit present.      Mental Status: She is oriented to person, place, and time.      Cranial Nerves: No cranial nerve deficit.      Motor: No weakness.     ECOG SCORE    1  - Restricted in strenuous activity-ambulatory and able to carry out work of a light nature        LABORATORY  Lab Results   Component Value Date/Time    WBC 8.9 02/28/2023 02:51 PM    RBC 5.01 02/28/2023 02:51 PM    HGB 13.8 02/28/2023 02:51 PM    HCT 44.0 02/28/2023 02:51 PM     (L) 02/28/2023 02:51 PM    ABSNEUTRO 5.85 02/28/2023 02:51 PM    NEUTROAUTO 65.6 02/28/2023 02:51 PM    ABSLYMPH 2.22 02/28/2023 02:51 PM    LYMPHAUTO 24.9 02/28/2023 02:51 PM    ABSEOS 0.20 02/28/2023 02:51 PM    EOSAUTO 2.2 02/28/2023 02:51 PM    ABSBASO 0.06 02/28/2023 02:51 PM    BASOPHILAUTO 0.7 02/28/2023 02:51 PM        Chemistry        Component Value Date/Time     02/28/2023 1451    K 4.6 02/28/2023 1451    CO2 23 02/28/2023 1451    BUN 19.2 02/28/2023 1451    CREATININE 0.84 02/28/2023 1451        Component Value Date/Time    CALCIUM 9.9 02/28/2023 1451    ALKPHOS 100 02/28/2023 1451    AST 19 02/28/2023 1451    ALT 9 02/28/2023 1451    BILITOT 0.4 02/28/2023 1451    EGFRNONAA 46 05/27/2022 1314        Assessment:   Recurrent metastatic breast cancer - initial diagnosis 2008, ER/[place] +, HER-2 negative  Solitary pulmonary met s/p left pneumonectomy 11/18 - ER low +, [place] negative, HER-2 negative  Postmenopausal s/p hysterectomy  Osteopenia      Plan:   CT images reviewed in the office in the presence of the patient and her .  Stable, chronic findings with no definite metastatic disease.  Continue hormonal therapy with Arimidex.  Start Fosamax 70 mg weekly for her osteopenia.  Benefits, risks and side effects reviewed and discussed.  Scheduled follow-up CT scans of the chest, abdomen and pelvis in 6 months at [facility] so that images can be directly compared.  RTC after the scans for a follow-up visit and clinical exam.      ESPERANZA KAHN MD    Other Physicians  Dr. Josey Gilbert

## 2023-02-28 ENCOUNTER — HOSPITAL ENCOUNTER (OUTPATIENT)
Dept: RADIOLOGY | Facility: HOSPITAL | Age: 73
Discharge: HOME | End: 2023-02-28
Attending: NURSE PRACTITIONER
Payer: COMMERCIAL

## 2023-02-28 LAB
CREAT SERPL-MCNC: 0.8 MG/DL (ref 0.5–1.4)
SAMPLE: NORMAL

## 2023-02-28 PROCEDURE — 25500020 PHARM REV CODE 255: Mod: 59 | Performed by: NURSE PRACTITIONER

## 2023-02-28 PROCEDURE — 74177 CT ABD & PELVIS W/CONTRAST: CPT | Mod: TC

## 2023-02-28 PROCEDURE — 71260 CT THORAX DX C+: CPT | Mod: TC

## 2023-02-28 RX ADMIN — DIATRIZOATE MEGLUMINE AND DIATRIZOATE SODIUM 30 ML: 660; 100 LIQUID ORAL; RECTAL at 01:02

## 2023-02-28 RX ADMIN — IOPAMIDOL 100 ML: 755 INJECTION, SOLUTION INTRAVENOUS at 02:02

## 2023-03-01 ENCOUNTER — OFFICE VISIT (OUTPATIENT)
Dept: HEMATOLOGY/ONCOLOGY | Facility: CLINIC | Age: 73
End: 2023-03-01
Payer: COMMERCIAL

## 2023-03-01 VITALS
SYSTOLIC BLOOD PRESSURE: 143 MMHG | TEMPERATURE: 98 F | HEART RATE: 105 BPM | RESPIRATION RATE: 18 BRPM | BODY MASS INDEX: 24.86 KG/M2 | OXYGEN SATURATION: 96 % | HEIGHT: 63 IN | DIASTOLIC BLOOD PRESSURE: 78 MMHG | WEIGHT: 140.31 LBS

## 2023-03-01 DIAGNOSIS — C50.412 MALIGNANT NEOPLASM OF UPPER-OUTER QUADRANT OF LEFT FEMALE BREAST, UNSPECIFIED ESTROGEN RECEPTOR STATUS: Primary | ICD-10-CM

## 2023-03-01 DIAGNOSIS — C34.90 MALIGNANT NEOPLASM OF UNSPECIFIED PART OF UNSPECIFIED BRONCHUS OR LUNG: ICD-10-CM

## 2023-03-01 DIAGNOSIS — M85.89 OSTEOPENIA OF MULTIPLE SITES: ICD-10-CM

## 2023-03-01 DIAGNOSIS — C50.512 MALIGNANT NEOPLASM OF LOWER-OUTER QUADRANT OF LEFT FEMALE BREAST, UNSPECIFIED ESTROGEN RECEPTOR STATUS: ICD-10-CM

## 2023-03-01 PROCEDURE — 99214 PR OFFICE/OUTPT VISIT, EST, LEVL IV, 30-39 MIN: ICD-10-PCS | Mod: S$GLB,,, | Performed by: INTERNAL MEDICINE

## 2023-03-01 PROCEDURE — 1101F PR PT FALLS ASSESS DOC 0-1 FALLS W/OUT INJ PAST YR: ICD-10-PCS | Mod: CPTII,S$GLB,, | Performed by: INTERNAL MEDICINE

## 2023-03-01 PROCEDURE — 99999 PR PBB SHADOW E&M-EST. PATIENT-LVL IV: ICD-10-PCS | Mod: PBBFAC,,, | Performed by: INTERNAL MEDICINE

## 2023-03-01 PROCEDURE — 1159F MED LIST DOCD IN RCRD: CPT | Mod: CPTII,S$GLB,, | Performed by: INTERNAL MEDICINE

## 2023-03-01 PROCEDURE — 3008F PR BODY MASS INDEX (BMI) DOCUMENTED: ICD-10-PCS | Mod: CPTII,S$GLB,, | Performed by: INTERNAL MEDICINE

## 2023-03-01 PROCEDURE — 99999 PR PBB SHADOW E&M-EST. PATIENT-LVL IV: CPT | Mod: PBBFAC,,, | Performed by: INTERNAL MEDICINE

## 2023-03-01 PROCEDURE — 3008F BODY MASS INDEX DOCD: CPT | Mod: CPTII,S$GLB,, | Performed by: INTERNAL MEDICINE

## 2023-03-01 PROCEDURE — 3077F PR MOST RECENT SYSTOLIC BLOOD PRESSURE >= 140 MM HG: ICD-10-PCS | Mod: CPTII,S$GLB,, | Performed by: INTERNAL MEDICINE

## 2023-03-01 PROCEDURE — 3288F PR FALLS RISK ASSESSMENT DOCUMENTED: ICD-10-PCS | Mod: CPTII,S$GLB,, | Performed by: INTERNAL MEDICINE

## 2023-03-01 PROCEDURE — 1126F PR PAIN SEVERITY QUANTIFIED, NO PAIN PRESENT: ICD-10-PCS | Mod: CPTII,S$GLB,, | Performed by: INTERNAL MEDICINE

## 2023-03-01 PROCEDURE — 3288F FALL RISK ASSESSMENT DOCD: CPT | Mod: CPTII,S$GLB,, | Performed by: INTERNAL MEDICINE

## 2023-03-01 PROCEDURE — 1101F PT FALLS ASSESS-DOCD LE1/YR: CPT | Mod: CPTII,S$GLB,, | Performed by: INTERNAL MEDICINE

## 2023-03-01 PROCEDURE — 99214 OFFICE O/P EST MOD 30 MIN: CPT | Mod: S$GLB,,, | Performed by: INTERNAL MEDICINE

## 2023-03-01 PROCEDURE — 1126F AMNT PAIN NOTED NONE PRSNT: CPT | Mod: CPTII,S$GLB,, | Performed by: INTERNAL MEDICINE

## 2023-03-01 PROCEDURE — 1159F PR MEDICATION LIST DOCUMENTED IN MEDICAL RECORD: ICD-10-PCS | Mod: CPTII,S$GLB,, | Performed by: INTERNAL MEDICINE

## 2023-03-01 PROCEDURE — 3077F SYST BP >= 140 MM HG: CPT | Mod: CPTII,S$GLB,, | Performed by: INTERNAL MEDICINE

## 2023-03-01 PROCEDURE — 3078F PR MOST RECENT DIASTOLIC BLOOD PRESSURE < 80 MM HG: ICD-10-PCS | Mod: CPTII,S$GLB,, | Performed by: INTERNAL MEDICINE

## 2023-03-01 PROCEDURE — 3078F DIAST BP <80 MM HG: CPT | Mod: CPTII,S$GLB,, | Performed by: INTERNAL MEDICINE

## 2023-03-01 RX ORDER — ANASTROZOLE 1 MG/1
TABLET ORAL
Qty: 30 TABLET | Refills: 6 | Status: SHIPPED | OUTPATIENT
Start: 2023-03-01 | End: 2023-10-23

## 2023-03-01 RX ORDER — ALENDRONATE SODIUM 70 MG/1
70 TABLET ORAL
Qty: 4 TABLET | Refills: 11 | Status: SHIPPED | OUTPATIENT
Start: 2023-03-01 | End: 2024-02-20

## 2023-03-01 RX ORDER — MELOXICAM 7.5 MG/1
1 TABLET ORAL DAILY
COMMUNITY
Start: 2023-02-04 | End: 2023-08-10

## 2023-03-06 ENCOUNTER — OFFICE VISIT (OUTPATIENT)
Dept: INTERNAL MEDICINE | Facility: CLINIC | Age: 73
End: 2023-03-06
Payer: COMMERCIAL

## 2023-03-06 VITALS
OXYGEN SATURATION: 96 % | WEIGHT: 139 LBS | HEART RATE: 96 BPM | RESPIRATION RATE: 17 BRPM | SYSTOLIC BLOOD PRESSURE: 132 MMHG | BODY MASS INDEX: 24.63 KG/M2 | DIASTOLIC BLOOD PRESSURE: 86 MMHG | TEMPERATURE: 98 F | HEIGHT: 63 IN

## 2023-03-06 DIAGNOSIS — E11.65 TYPE 2 DIABETES MELLITUS WITH HYPERGLYCEMIA, WITHOUT LONG-TERM CURRENT USE OF INSULIN: Primary | ICD-10-CM

## 2023-03-06 DIAGNOSIS — C50.512 MALIGNANT NEOPLASM OF LOWER-OUTER QUADRANT OF LEFT FEMALE BREAST, UNSPECIFIED ESTROGEN RECEPTOR STATUS: ICD-10-CM

## 2023-03-06 DIAGNOSIS — F41.9 ANXIETY: ICD-10-CM

## 2023-03-06 DIAGNOSIS — I10 HYPERTENSION, UNSPECIFIED TYPE: ICD-10-CM

## 2023-03-06 PROCEDURE — 1160F RVW MEDS BY RX/DR IN RCRD: CPT | Mod: CPTII,,, | Performed by: STUDENT IN AN ORGANIZED HEALTH CARE EDUCATION/TRAINING PROGRAM

## 2023-03-06 PROCEDURE — 3075F SYST BP GE 130 - 139MM HG: CPT | Mod: CPTII,,, | Performed by: STUDENT IN AN ORGANIZED HEALTH CARE EDUCATION/TRAINING PROGRAM

## 2023-03-06 PROCEDURE — 1126F AMNT PAIN NOTED NONE PRSNT: CPT | Mod: CPTII,,, | Performed by: STUDENT IN AN ORGANIZED HEALTH CARE EDUCATION/TRAINING PROGRAM

## 2023-03-06 PROCEDURE — 3008F BODY MASS INDEX DOCD: CPT | Mod: CPTII,,, | Performed by: STUDENT IN AN ORGANIZED HEALTH CARE EDUCATION/TRAINING PROGRAM

## 2023-03-06 PROCEDURE — 1126F PR PAIN SEVERITY QUANTIFIED, NO PAIN PRESENT: ICD-10-PCS | Mod: CPTII,,, | Performed by: STUDENT IN AN ORGANIZED HEALTH CARE EDUCATION/TRAINING PROGRAM

## 2023-03-06 PROCEDURE — 1101F PR PT FALLS ASSESS DOC 0-1 FALLS W/OUT INJ PAST YR: ICD-10-PCS | Mod: CPTII,,, | Performed by: STUDENT IN AN ORGANIZED HEALTH CARE EDUCATION/TRAINING PROGRAM

## 2023-03-06 PROCEDURE — 3079F PR MOST RECENT DIASTOLIC BLOOD PRESSURE 80-89 MM HG: ICD-10-PCS | Mod: CPTII,,, | Performed by: STUDENT IN AN ORGANIZED HEALTH CARE EDUCATION/TRAINING PROGRAM

## 2023-03-06 PROCEDURE — 3288F PR FALLS RISK ASSESSMENT DOCUMENTED: ICD-10-PCS | Mod: CPTII,,, | Performed by: STUDENT IN AN ORGANIZED HEALTH CARE EDUCATION/TRAINING PROGRAM

## 2023-03-06 PROCEDURE — 99214 OFFICE O/P EST MOD 30 MIN: CPT | Mod: ,,, | Performed by: STUDENT IN AN ORGANIZED HEALTH CARE EDUCATION/TRAINING PROGRAM

## 2023-03-06 PROCEDURE — 1159F PR MEDICATION LIST DOCUMENTED IN MEDICAL RECORD: ICD-10-PCS | Mod: CPTII,,, | Performed by: STUDENT IN AN ORGANIZED HEALTH CARE EDUCATION/TRAINING PROGRAM

## 2023-03-06 PROCEDURE — 3075F PR MOST RECENT SYSTOLIC BLOOD PRESS GE 130-139MM HG: ICD-10-PCS | Mod: CPTII,,, | Performed by: STUDENT IN AN ORGANIZED HEALTH CARE EDUCATION/TRAINING PROGRAM

## 2023-03-06 PROCEDURE — 99214 PR OFFICE/OUTPT VISIT, EST, LEVL IV, 30-39 MIN: ICD-10-PCS | Mod: ,,, | Performed by: STUDENT IN AN ORGANIZED HEALTH CARE EDUCATION/TRAINING PROGRAM

## 2023-03-06 PROCEDURE — 1101F PT FALLS ASSESS-DOCD LE1/YR: CPT | Mod: CPTII,,, | Performed by: STUDENT IN AN ORGANIZED HEALTH CARE EDUCATION/TRAINING PROGRAM

## 2023-03-06 PROCEDURE — 3079F DIAST BP 80-89 MM HG: CPT | Mod: CPTII,,, | Performed by: STUDENT IN AN ORGANIZED HEALTH CARE EDUCATION/TRAINING PROGRAM

## 2023-03-06 PROCEDURE — 3288F FALL RISK ASSESSMENT DOCD: CPT | Mod: CPTII,,, | Performed by: STUDENT IN AN ORGANIZED HEALTH CARE EDUCATION/TRAINING PROGRAM

## 2023-03-06 PROCEDURE — 1160F PR REVIEW ALL MEDS BY PRESCRIBER/CLIN PHARMACIST DOCUMENTED: ICD-10-PCS | Mod: CPTII,,, | Performed by: STUDENT IN AN ORGANIZED HEALTH CARE EDUCATION/TRAINING PROGRAM

## 2023-03-06 PROCEDURE — 1159F MED LIST DOCD IN RCRD: CPT | Mod: CPTII,,, | Performed by: STUDENT IN AN ORGANIZED HEALTH CARE EDUCATION/TRAINING PROGRAM

## 2023-03-06 PROCEDURE — 3008F PR BODY MASS INDEX (BMI) DOCUMENTED: ICD-10-PCS | Mod: CPTII,,, | Performed by: STUDENT IN AN ORGANIZED HEALTH CARE EDUCATION/TRAINING PROGRAM

## 2023-03-06 RX ORDER — UMECLIDINIUM BROMIDE AND VILANTEROL TRIFENATATE 62.5; 25 UG/1; UG/1
1 POWDER RESPIRATORY (INHALATION) DAILY
Qty: 3 EACH | Refills: 3 | Status: SHIPPED | OUTPATIENT
Start: 2023-03-06 | End: 2023-07-15

## 2023-03-06 RX ORDER — ALPRAZOLAM 0.5 MG/1
0.5 TABLET ORAL 2 TIMES DAILY PRN
Qty: 30 TABLET | Refills: 0 | Status: SHIPPED | OUTPATIENT
Start: 2023-03-06 | End: 2023-05-09

## 2023-03-06 RX ORDER — PREGABALIN 50 MG/1
50 CAPSULE ORAL 2 TIMES DAILY
Qty: 60 CAPSULE | Refills: 0 | Status: SHIPPED | OUTPATIENT
Start: 2023-03-06 | End: 2023-06-10

## 2023-03-06 NOTE — PROGRESS NOTES
Subjective:      Ileana Mathews  2023  [id number]      Chief Complaint: Hypertension (6 Month)       HPI:  Ms Tejeda presents for 6 months follow up. Patient is doing well, states has been under a lot of stress due to her  recent diagnosis of liver cancer. Patient would like to have medication for anxiety as needed. No other complaints.     Past Medical History:   Diagnosis Date    Anxiety/depression     Arthritis     Breast cancer     COPD (chronic obstructive pulmonary disease)     Diabetes mellitus     GERD (gastroesophageal reflux disease)     Hyperlipidemia     Hypertension     Panic attacks     SVT (supraventricular tachycardia)      Past Surgical History:   Procedure Laterality Date    BLADDER SUSPENSION      HYSTERECTOMY      BSO    Mediport placement and removal      PNEUMONECTOMY Left     RHINOPLASTY      TONSILLECTOMY       Family History   Problem Relation Age of Onset    Breast cancer Mother         bilateral    Lung cancer Paternal Grandmother      Social History     Tobacco Use    Smoking status: Former     Packs/day: 2.50     Years: 40.00     Pack years: 100.00     Types: Cigarettes     Quit date: 2018     Years since quittin.3    Smokeless tobacco: Never   Substance and Sexual Activity    Alcohol use: Yes    Drug use: Never    Sexual activity: Not Currently     Review of patient's allergies indicates:  No Known Allergies    The following were reviewed at this visit: active problem list, medication list, allergies, family history, social history, and health maintenance.    Medications:    Current Outpatient Medications:     albuterol (PROVENTIL/VENTOLIN HFA) 90 mcg/actuation inhaler, SMARTSI Puff(s) Via Inhaler Every 6 Hours, Disp: , Rfl:     alendronate (FOSAMAX) 70 MG tablet, Take 1 tablet (70 mg total) by mouth every 7 days., Disp: 4 tablet, Rfl: 11    amLODIPine (NORVASC) 5 MG tablet, Take 1 tablet by mouth once daily., Disp: , Rfl:     anastrozole (ARIMIDEX) 1 mg Tab,  TAKE 1 TABLET BY MOUTH ONCE DAILY, Disp: 30 tablet, Rfl: 6    azelastine-fluticasone (DYMISTA) 137-50 mcg/spray Spry nassal spray, 1 spray by Each Nostril route 2 (two) times daily., Disp: , Rfl:     b complex vitamins capsule, Take 1 capsule by mouth once daily., Disp: , Rfl:     esomeprazole (NEXIUM) 40 MG capsule, Take 40 mg by mouth once daily., Disp: , Rfl:     FARXIGA 10 mg tablet, Take 10 mg by mouth once daily., Disp: , Rfl:     glimepiride (AMARYL) 4 MG tablet, Take 4 mg by mouth once daily., Disp: , Rfl:     LINZESS 72 mcg Cap capsule, TAKE 1 CAPSULE ORAL DAILY INSTR:DO NOT CRUSH OR CHEW, Disp: 30 capsule, Rfl: 1    meloxicam (MOBIC) 7.5 MG tablet, Take 1 tablet by mouth once daily., Disp: , Rfl:     omega-3 fatty acids/fish oil (FISH OIL-OMEGA-3 FATTY ACIDS) 300-1,000 mg capsule, Take 1 g by mouth once daily., Disp: , Rfl:     ALPRAZolam (XANAX) 0.5 MG tablet, Take 1 tablet (0.5 mg total) by mouth 2 (two) times daily as needed for Anxiety., Disp: 30 tablet, Rfl: 0    ANORO ELLIPTA 62.5-25 mcg/actuation DsDv, Inhale 1 puff into the lungs once daily., Disp: 3 each, Rfl: 3    pregabalin (LYRICA) 50 MG capsule, Take 1 capsule (50 mg total) by mouth 2 (two) times daily., Disp: 60 capsule, Rfl: 0    TRULICITY 1.5 mg/0.5 mL pen injector, Inject 1.5 mg into the skin every 7 days., Disp: , Rfl:       Medications have been reviewed and reconciled with patient at this visit.  Barriers to medications reviewed with patient.    Adverse reactions to current medications reviewed with patient..    Over the counter medications reviewed and reconciled with patient.  Review of Systems   Constitutional:  Negative for chills, fever, malaise/fatigue and weight loss.   HENT:  Negative for congestion, ear discharge, ear pain, hearing loss, sinus pain and sore throat.    Eyes:  Negative for photophobia, pain, discharge and redness.   Respiratory:  Negative for cough, shortness of breath and wheezing.    Cardiovascular:  Negative  "for chest pain, palpitations and leg swelling.   Gastrointestinal:  Negative for constipation, diarrhea, heartburn, nausea and vomiting.   Genitourinary:  Negative for dysuria, frequency and urgency.   Musculoskeletal:  Negative for falls, joint pain and myalgias.   Skin:  Negative for itching and rash.   Neurological:  Negative for dizziness, focal weakness, weakness and headaches.   Psychiatric/Behavioral:  Negative for depression and memory loss. The patient is not nervous/anxious and does not have insomnia.          Objective:      Vitals:    03/06/23 1308   BP: 132/86   BP Location: Left arm   Patient Position: Sitting   BP Method: Large (Manual)   Pulse: 96   Resp: 17   Temp: 98 °F (36.7 °C)   TempSrc: Oral   SpO2: 96%   Weight: 63 kg (139 lb)   Height: 5' 3" (1.6 m)       Physical Exam  Constitutional:       General: She is not in acute distress.     Appearance: Normal appearance.   HENT:      Head: Normocephalic and atraumatic.   Eyes:      Extraocular Movements: Extraocular movements intact.      Pupils: Pupils are equal, round, and reactive to light.   Cardiovascular:      Rate and Rhythm: Normal rate and regular rhythm.      Pulses: Normal pulses.      Heart sounds: Normal heart sounds. No murmur heard.    No friction rub. No gallop.   Pulmonary:      Effort: Pulmonary effort is normal.      Breath sounds: Normal breath sounds. No wheezing, rhonchi or rales.   Abdominal:      General: Abdomen is flat. Bowel sounds are normal. There is no distension.      Palpations: Abdomen is soft.      Tenderness: There is no abdominal tenderness.   Musculoskeletal:         General: No swelling or tenderness. Normal range of motion.      Cervical back: Normal range of motion and neck supple. No tenderness.      Right lower leg: No edema.      Left lower leg: No edema.   Lymphadenopathy:      Cervical: No cervical adenopathy.   Skin:     Findings: No lesion or rash.   Neurological:      General: No focal deficit present. "      Mental Status: She is alert and oriented to person, place, and time.      Cranial Nerves: No cranial nerve deficit.      Sensory: No sensory deficit.      Motor: No weakness.   Psychiatric:         Mood and Affect: Mood normal.         Behavior: Behavior normal.         Thought Content: Thought content normal.             Assessment and Plan:       1. Type 2 diabetes mellitus with hyperglycemia, without long-term current use of insulin  Assessment & Plan:  Will not follow with Dr Gilbert anymore  Will repeat A1C  Continue current medications     Orders:  -     Hemoglobin A1C; Future; Expected date: 03/06/2023    2. Hypertension, unspecified type  Assessment & Plan:  Controlled  Continue current medications       3. Anxiety  Assessment & Plan:  Will prescribe alprazolam 0.5 mg BID as needed       4. Malignant neoplasm of lower-outer quadrant of left female breast, unspecified estrogen receptor status  Assessment & Plan:  Follows with Oncology     Orders:  -     pregabalin (LYRICA) 50 MG capsule; Take 1 capsule (50 mg total) by mouth 2 (two) times daily.  Dispense: 60 capsule; Refill: 0    Other orders  -     ALPRAZolam (XANAX) 0.5 MG tablet; Take 1 tablet (0.5 mg total) by mouth 2 (two) times daily as needed for Anxiety.  Dispense: 30 tablet; Refill: 0  -     ANORO ELLIPTA 62.5-25 mcg/actuation DsDv; Inhale 1 puff into the lungs once daily.  Dispense: 3 each; Refill: 3            Follow up: Follow up in about 6 months (around 9/6/2023) for wellness, Labs check.

## 2023-03-06 NOTE — LETTER
AUTHORIZATION FOR RELEASE OF   CONFIDENTIAL INFORMATION    Dear Dr. Spence,    We are seeing Ileana Mathews, date of birth 1950, in the clinic at [address]. Josey Ruelas MD is the patient's PCP. Ileana Mathews has an outstanding lab/procedure at the time we reviewed her chart. In order to help keep her health information updated, she has authorized us to request the following medical record(s):        (  )  MAMMOGRAM                                      ( x )  COLONOSCOPY      (  )  PAP SMEAR                                          (  )  OUTSIDE LAB RESULTS     (  )  DEXA SCAN                                          (  )  EYE EXAM            (  )  FOOT EXAM                                          (  )  ENTIRE RECORD     (  )  OUTSIDE IMMUNIZATIONS                 (  )  _______________         Please fax records to [facility], Daniela M Leon-Jarrin, MD, [phone]     If you have any questions, please contact Marianne at [phone].           Patient Name: Ileana Mathews  : 1950  Patient Phone #: [phone]

## 2023-03-06 NOTE — LETTER
AUTHORIZATION FOR RELEASE OF   CONFIDENTIAL INFORMATION    Dear ***,    We are seeing Ileana Mathews, date of birth 1950, in the clinic at [address]. Josey Ruelas MD is the patient's PCP. Ileana Mathews has an outstanding lab/procedure at the time we reviewed her chart. In order to help keep her health information updated, she has authorized us to request the following medical record(s):        (  )  MAMMOGRAM                                      (  )  COLONOSCOPY      (  )  PAP SMEAR                                          (  )  OUTSIDE LAB RESULTS     (  )  DEXA SCAN                                          (  )  EYE EXAM            (  )  FOOT EXAM                                          (  )  ENTIRE RECORD     (  )  OUTSIDE IMMUNIZATIONS                 (  )  _______________         Please fax records to [facility], Daniela M Leon-Jarrin, MD, ***     If you have any questions, please contact *** at [phone]*.           Patient Name: Ileana Mathews  : 1950  Patient Phone #: [phone]

## 2023-03-07 PROBLEM — C50.512 MALIGNANT NEOPLASM OF LOWER-OUTER QUADRANT OF LEFT FEMALE BREAST: Status: ACTIVE | Noted: 2023-03-07

## 2023-03-07 PROBLEM — F41.9 ANXIETY: Status: ACTIVE | Noted: 2023-03-07

## 2023-03-07 NOTE — ASSESSMENT & PLAN NOTE
Controlled  Continue current medications   
Follows with Oncology   
Will not follow with Dr Gilbert anymore  Will repeat A1C  Continue current medications   
Will prescribe alprazolam 0.5 mg BID as needed   
FROM

## 2023-03-20 ENCOUNTER — PATIENT OUTREACH (OUTPATIENT)
Dept: INTERNAL MEDICINE | Facility: CLINIC | Age: 73
End: 2023-03-20
Payer: COMMERCIAL

## 2023-03-20 NOTE — PROGRESS NOTES
Records Received, hyper-linked into chart at this time. The following record(s)  below were uploaded for Health Maintenance .               6 /16/2016 COLONOSCOPY

## 2023-05-09 RX ORDER — ESOMEPRAZOLE MAGNESIUM 40 MG/1
CAPSULE, DELAYED RELEASE ORAL
Qty: 30 CAPSULE | Refills: 11 | Status: SHIPPED | OUTPATIENT
Start: 2023-05-09

## 2023-05-09 RX ORDER — ALPRAZOLAM 0.5 MG/1
0.5 TABLET ORAL 2 TIMES DAILY PRN
Qty: 30 TABLET | Refills: 0 | Status: SHIPPED | OUTPATIENT
Start: 2023-05-09 | End: 2023-10-23

## 2023-05-19 ENCOUNTER — TELEPHONE (OUTPATIENT)
Dept: INTERNAL MEDICINE | Facility: CLINIC | Age: 73
End: 2023-05-19
Payer: COMMERCIAL

## 2023-05-19 NOTE — TELEPHONE ENCOUNTER
Pt called and stated she had a x-ray of the spine done and was having the results forwarded to us, after MD review a referral to Cardio was recommended but was not urgent, Pt couldn't recall the Cardiologist she saw in the past and she didn't want to go back there, [facility] was recommended, Pt stated she would call the office back with who she wanted to see

## 2023-06-07 ENCOUNTER — DOCUMENTATION ONLY (OUTPATIENT)
Dept: ADMINISTRATIVE | Facility: HOSPITAL | Age: 73
End: 2023-06-07
Payer: COMMERCIAL

## 2023-06-09 DIAGNOSIS — C50.512 MALIGNANT NEOPLASM OF LOWER-OUTER QUADRANT OF LEFT FEMALE BREAST, UNSPECIFIED ESTROGEN RECEPTOR STATUS: ICD-10-CM

## 2023-06-10 RX ORDER — PREGABALIN 50 MG/1
CAPSULE ORAL
Qty: 60 CAPSULE | Refills: 5 | Status: SHIPPED | OUTPATIENT
Start: 2023-06-10 | End: 2023-08-10

## 2023-07-12 ENCOUNTER — TELEPHONE (OUTPATIENT)
Dept: HEMATOLOGY/ONCOLOGY | Facility: CLINIC | Age: 73
End: 2023-07-12
Payer: COMMERCIAL

## 2023-07-14 DIAGNOSIS — N18.31 CHRONIC KIDNEY DISEASE, STAGE 3A: Primary | ICD-10-CM

## 2023-07-14 DIAGNOSIS — J44.9 CHRONIC OBSTRUCTIVE PULMONARY DISEASE, UNSPECIFIED COPD TYPE: ICD-10-CM

## 2023-07-15 RX ORDER — UMECLIDINIUM BROMIDE AND VILANTEROL TRIFENATATE 62.5; 25 UG/1; UG/1
1 POWDER RESPIRATORY (INHALATION) DAILY
Qty: 60 EACH | Refills: 3 | Status: SHIPPED | OUTPATIENT
Start: 2023-07-15 | End: 2024-02-20

## 2023-07-18 ENCOUNTER — TELEPHONE (OUTPATIENT)
Dept: HEMATOLOGY/ONCOLOGY | Facility: CLINIC | Age: 73
End: 2023-07-18
Payer: COMMERCIAL

## 2023-08-02 ENCOUNTER — HOSPITAL ENCOUNTER (OUTPATIENT)
Dept: RADIOLOGY | Facility: HOSPITAL | Age: 73
Discharge: HOME | End: 2023-08-02
Attending: INTERNAL MEDICINE
Payer: COMMERCIAL

## 2023-08-02 DIAGNOSIS — C50.412 MALIGNANT NEOPLASM OF UPPER-OUTER QUADRANT OF LEFT FEMALE BREAST, UNSPECIFIED ESTROGEN RECEPTOR STATUS: ICD-10-CM

## 2023-08-02 DIAGNOSIS — C34.90 MALIGNANT NEOPLASM OF UNSPECIFIED PART OF UNSPECIFIED BRONCHUS OR LUNG: ICD-10-CM

## 2023-08-02 LAB
CREAT SERPL-MCNC: 0.8 MG/DL (ref 0.5–1.4)
SAMPLE: NORMAL

## 2023-08-02 PROCEDURE — 74177 CT ABD & PELVIS W/CONTRAST: CPT | Mod: TC

## 2023-08-02 PROCEDURE — 71260 CT THORAX DX C+: CPT | Mod: TC

## 2023-08-02 PROCEDURE — 25500020 PHARM REV CODE 255: Performed by: INTERNAL MEDICINE

## 2023-08-02 RX ADMIN — IOPAMIDOL 100 ML: 755 INJECTION, SOLUTION INTRAVENOUS at 09:08

## 2023-08-02 RX ADMIN — DIATRIZOATE MEGLUMINE AND DIATRIZOATE SODIUM 30 ML: 660; 100 LIQUID ORAL; RECTAL at 08:08

## 2023-08-03 NOTE — PROGRESS NOTES
Subjective:       Patient ID: Ileana Mathews is a 72 y.o. female.    Chief Complaint:  No problems    Diagnosis: Recurrent metastatic breast cancer - ER low +/CT -, HER-2 negative ([facility] 0)                    Solitary left lung met s/p left pneumonectomy 11/18                    S/p bilateral mastectomies                    Postmenopausal s/p hysterectomy        + COVID-19 vaccinated    Treatment History  Neoadjuvant TC x4 2008 --> B mastectomies --> TC x4 --> XRT --> Arimidex x 7 yrs    Current Treatment: Arimidex 1 mg daily (restarted 11/18)    Clinical History:  Patient was initially diagnosed with a stage III left breast cancer in 2008 with 2 out of 27 positive axillary lymph nodes. She was treated by Dr. Donald Foss in [place]. Her pathology was mixed ductal and lobular - ER +88%, CT +26% and HER-2 negative ([facility]). She underwent neoadjuvant chemotherapy with 4 cycles of TC followed by bilateral mastectomies and 4 more cycles of TC. After completion of chemotherapy, she received adjuvant radiation therapy followed by Arimidex for 7 years. She transitioned to the [facility] 6/16 with Dr. Yan. Ongoing observation was recommended. A solitary pulmonary nodule was discovered on surveillance imaging in the left lung in 2018. Follow-up scan showed increased size of the nodule with no other findings of metastatic disease. PET scan showed no additional suspicious areas. She was referred to CV surgery for a planned wedge resection of the pulmonary nodule. Due to complications at the time of surgery, she required a left pneumonectomy. I do not have a copy of the operative report, but the patient stated there were bleeding complications due to a vascular injury. Surgical pathology showed a metastatic adenocarcinoma consistent with breast primary measuring 1.1 cm. Cells were positive for CK7, MIREYA-3, GCDFP15 and mammoglobin and negative for TTF-1, CK 20, CDX2 and PAX-8. 10 resected lymph nodes were  negative for involvement. Tumor was low ER +1.8%, CT negative and HER-2 negative ([facility]). She was placed back on Arimidex 1 mg daily.    Surveillance CT of the chest 12/5/19 ([facility]) showed postoperative changes from previous left pneumonectomy and no findings suspicious for metastatic disease including the visualized upper abdomen. She was seen in the ER for lower abdominal pain 2/1/20. CT scan of the abdomen and pelvis without contrast showed a large amount of stool in the colon and rectum, cholelithiasis and a left renal cyst. CT scan of the chest 2/27/20 noted a 3 mm nodule in the right anterior lung which on retrospect was present and stable on the previous CT scan. Left pneumonectomy changes were stable with mediastinal shift. There were no other lesions suspicious for metastatic disease.    She was seen as a new patient at [facility]11[facility]20 to establish ongoing Oncology care and follow-up. She was still taking Arimidex 1 mg daily. She had no significant side effects related to the treatment. She was continued on Arimidex. Bone density exam 10/15/20 showed osteopenia of the AP spine, left and right hip with T scores of -1.0, -2.0 and -2.0 respectively.      CT C/A/P 8/23/21 ([facility]):  Changes from prior pneumonectomy with a small left pleural fluid collection and chronic emphysematous changes in the right lung.  There was a small, stable RUL nodule unchanged from previous exams.  There was no evidence of metastatic disease in the abdomen or pelvis.   CT C/A/P 2/16/22 ([facility]):  Sable changes from prior left pneumonectomy and a subcentimeter triangular focus in the right upper lobe unchanged from her previous exams.    CT C/A/P 2/24/23 ([facility]):  Previous left pneumonectomy with hyperexpanded right lung.  Encapsulated pleural effusion left hemithorax.  Mild subpleural interstitial reticulation and scarring of the right lung and two 5 mm nodules RUL (described on previous scans).  A  subcentimeter hypodensity in hepatic segment 6 was too small to characterize.  Outside films were not available for direct comparison.  CT C/A/P 8/2/23:  Stable postoperative changes and small RUL nodules.  No findings suspicious for disease recurrence.     Interval History   She returns to clinic today for a six-month follow-up visit accompanied by her .  She remains on treatment with Arimidex.  She continues to tolerate treatment well.  No significant hot flashes or joint symptoms.  She has not had any recent illnesses.  She has no symptoms suspicious for disease recurrence.  She reports no changes on her self examination following bilateral mastectomies.  Surveillance CT scans of the chest, abdomen and pelvis 8/2/23 showed stable chronic findings with no evidence of disease recurrence.  Bone density exam 2/24/23 showed osteopenia of the lumbar spine with a T-score of -1.2 and both hips with a T-score of -2.2 on the left and -2.4 on the right.  She was started on weekly Fosamax.  Laboratory testing for this visit showed no significant abnormalities.  Her  has a history of cirrhosis and hepatocellular carcinoma.  He is currently being evaluated for a liver transplant.       Review of Systems   Constitutional:  Negative for appetite change, fatigue, fever and unexpected weight change.   HENT:  Negative for mouth sores, sore throat and trouble swallowing.    Eyes: Negative.    Respiratory:  Positive for shortness of breath (URIBE). Negative for cough.    Cardiovascular:  Negative for chest pain, palpitations and leg swelling.   Gastrointestinal:  Negative for abdominal distention, abdominal pain, constipation, diarrhea and nausea.   Genitourinary:  Negative for dysuria, frequency and urgency.   Musculoskeletal:  Positive for arthralgias and neck pain. Negative for back pain.   Integumentary:  Negative for pallor and rash.   Neurological:  Negative for dizziness, weakness, numbness and headaches.    Hematological:  Negative for adenopathy. Does not bruise/bleed easily.   Psychiatric/Behavioral: Negative.         PMHx:  HTN, hyperlipidemia, diabetes mellitus, SVT, COPD, GERD, arthritis, anxiety/depression, panic attacks  PSHx:  Tonsils, bladder suspension, hysterectomy/BSO, rhinoplasty, Mediport placement and removal, left pneumonectomy  SH:  Former smoker 1-2.5 PPD x 40 yrs, quit 11/18. Rare alcohol use. Lives in [place] with her , retired dispatcher for the [employer]  FH:  Her mother had bilateral breast cancer, paternal grandmother had lung cancer.     Objective:     Vitals:    08/10/23 0904   BP: 131/81   Pulse: 92   Resp: 16   Temp: 98.6 °F (37 °C)       Physical Exam  Constitutional:       Comments: Elderly, well-developed white female in NAD   HENT:      Head: Normocephalic.      Mouth/Throat:      Mouth: Mucous membranes are moist.      Pharynx: Oropharynx is clear. No posterior oropharyngeal erythema.   Eyes:      Extraocular Movements: Extraocular movements intact.      Conjunctiva/sclera: Conjunctivae normal.      Pupils: Pupils are equal, round, and reactive to light.   Cardiovascular:      Rate and Rhythm: Normal rate and regular rhythm.      Heart sounds: No murmur heard.  Pulmonary:      Comments: Well-healed left thoracotomy incision.  Absent breath sounds on the left, clear on the right.  Chest:      Comments: Well-healed bilateral mastectomy incisions.  No palpable masses, skin changes or axillary nodes bilaterally.  Abdominal:      General: Bowel sounds are normal. There is no distension.      Palpations: Abdomen is soft.      Tenderness: There is no abdominal tenderness.   Musculoskeletal:         General: No swelling or tenderness. Normal range of motion.      Cervical back: Neck supple. No tenderness.   Skin:     General: Skin is warm and dry.      Findings: No rash.   Neurological:      General: No focal deficit present.      Mental Status: She is oriented to person, place,  and time.      Cranial Nerves: No cranial nerve deficit.      Motor: No weakness.       ECOG SCORE    1 - Restricted in strenuous activity-ambulatory and able to carry out work of a light nature        LABORATORY  Lab Results   Component Value Date/Time    WBC 10.03 08/02/2023 09:33 AM    RBC 4.59 08/02/2023 09:33 AM    HGB 13.1 08/02/2023 09:33 AM    HCT 41.2 08/02/2023 09:33 AM     08/02/2023 09:33 AM    ABSNEUTRO 7.23 08/02/2023 09:33 AM    NEUTROAUTO 72.1 08/02/2023 09:33 AM    ABSLYMPH 1.91 08/02/2023 09:33 AM    LYMPHAUTO 19.0 08/02/2023 09:33 AM    ABSEOS 0.21 08/02/2023 09:33 AM    EOSAUTO 2.1 08/02/2023 09:33 AM    ABSBASO 0.04 08/02/2023 09:33 AM    BASOPHILAUTO 0.4 08/02/2023 09:33 AM        Chemistry        Component Value Date/Time     08/02/2023 0933    K 4.7 08/02/2023 0933    CO2 26 08/02/2023 0933    BUN 20.8 (H) 08/02/2023 0933    CREATININE 0.85 08/02/2023 0933        Component Value Date/Time    CALCIUM 10.0 08/02/2023 0933    ALKPHOS 104 08/02/2023 0933    AST 15 08/02/2023 0933    ALT 8 08/02/2023 0933    BILITOT 0.4 08/02/2023 0933    EGFRNONAA 46 05/27/2022 1314        Assessment:   Recurrent metastatic breast cancer - initial diagnosis 2008, ER/CO +, HER-2 negative  Solitary pulmonary met s/p left pneumonectomy 11/18 - ER low +, CO negative, HER-2 negative ([facility] 0)  S/p bilateral mastectomies  Postmenopausal s/p hysterectomy  Osteopenia      Plan:   Patient has no clinical or radiographic evidence of disease recurrence.  She is nearly 5 years out from her left pneumonectomy.  Continue hormonal therapy with Arimidex, tolerating well.  Continue weekly Fosamax for treatment of osteopenia.  RTC in 6 months for a follow-up visit and clinical exam with repeat laboratory.  Barring any problems, will consider follow-up imaging in 1 year.      ESPERANZA KAHN MD    Other Physicians  Dr. Josey Gilbert

## 2023-08-10 ENCOUNTER — OFFICE VISIT (OUTPATIENT)
Dept: HEMATOLOGY/ONCOLOGY | Facility: CLINIC | Age: 73
End: 2023-08-10
Payer: COMMERCIAL

## 2023-08-10 VITALS
OXYGEN SATURATION: 97 % | DIASTOLIC BLOOD PRESSURE: 81 MMHG | WEIGHT: 144.69 LBS | HEIGHT: 63 IN | BODY MASS INDEX: 25.64 KG/M2 | RESPIRATION RATE: 16 BRPM | SYSTOLIC BLOOD PRESSURE: 131 MMHG | TEMPERATURE: 99 F | HEART RATE: 92 BPM

## 2023-08-10 DIAGNOSIS — Z17.0 MALIGNANT NEOPLASM OF UPPER-OUTER QUADRANT OF LEFT BREAST IN FEMALE, ESTROGEN RECEPTOR POSITIVE: Primary | ICD-10-CM

## 2023-08-10 DIAGNOSIS — C50.412 MALIGNANT NEOPLASM OF UPPER-OUTER QUADRANT OF LEFT BREAST IN FEMALE, ESTROGEN RECEPTOR POSITIVE: Primary | ICD-10-CM

## 2023-08-10 DIAGNOSIS — G62.9 NEUROPATHY: ICD-10-CM

## 2023-08-10 PROCEDURE — 3288F PR FALLS RISK ASSESSMENT DOCUMENTED: ICD-10-PCS | Mod: CPTII,S$GLB,, | Performed by: INTERNAL MEDICINE

## 2023-08-10 PROCEDURE — 99214 PR OFFICE/OUTPT VISIT, EST, LEVL IV, 30-39 MIN: ICD-10-PCS | Mod: S$GLB,,, | Performed by: INTERNAL MEDICINE

## 2023-08-10 PROCEDURE — 99999 PR PBB SHADOW E&M-EST. PATIENT-LVL IV: ICD-10-PCS | Mod: PBBFAC,,, | Performed by: INTERNAL MEDICINE

## 2023-08-10 PROCEDURE — 99999 PR PBB SHADOW E&M-EST. PATIENT-LVL IV: CPT | Mod: PBBFAC,,, | Performed by: INTERNAL MEDICINE

## 2023-08-10 PROCEDURE — 3079F DIAST BP 80-89 MM HG: CPT | Mod: CPTII,S$GLB,, | Performed by: INTERNAL MEDICINE

## 2023-08-10 PROCEDURE — 1101F PR PT FALLS ASSESS DOC 0-1 FALLS W/OUT INJ PAST YR: ICD-10-PCS | Mod: CPTII,S$GLB,, | Performed by: INTERNAL MEDICINE

## 2023-08-10 PROCEDURE — 1159F PR MEDICATION LIST DOCUMENTED IN MEDICAL RECORD: ICD-10-PCS | Mod: CPTII,S$GLB,, | Performed by: INTERNAL MEDICINE

## 2023-08-10 PROCEDURE — 3075F SYST BP GE 130 - 139MM HG: CPT | Mod: CPTII,S$GLB,, | Performed by: INTERNAL MEDICINE

## 2023-08-10 PROCEDURE — 3079F PR MOST RECENT DIASTOLIC BLOOD PRESSURE 80-89 MM HG: ICD-10-PCS | Mod: CPTII,S$GLB,, | Performed by: INTERNAL MEDICINE

## 2023-08-10 PROCEDURE — 3044F HG A1C LEVEL LT 7.0%: CPT | Mod: CPTII,S$GLB,, | Performed by: INTERNAL MEDICINE

## 2023-08-10 PROCEDURE — 3288F FALL RISK ASSESSMENT DOCD: CPT | Mod: CPTII,S$GLB,, | Performed by: INTERNAL MEDICINE

## 2023-08-10 PROCEDURE — 3044F PR MOST RECENT HEMOGLOBIN A1C LEVEL <7.0%: ICD-10-PCS | Mod: CPTII,S$GLB,, | Performed by: INTERNAL MEDICINE

## 2023-08-10 PROCEDURE — 1160F RVW MEDS BY RX/DR IN RCRD: CPT | Mod: CPTII,S$GLB,, | Performed by: INTERNAL MEDICINE

## 2023-08-10 PROCEDURE — 99214 OFFICE O/P EST MOD 30 MIN: CPT | Mod: S$GLB,,, | Performed by: INTERNAL MEDICINE

## 2023-08-10 PROCEDURE — 1126F AMNT PAIN NOTED NONE PRSNT: CPT | Mod: CPTII,S$GLB,, | Performed by: INTERNAL MEDICINE

## 2023-08-10 PROCEDURE — 3075F PR MOST RECENT SYSTOLIC BLOOD PRESS GE 130-139MM HG: ICD-10-PCS | Mod: CPTII,S$GLB,, | Performed by: INTERNAL MEDICINE

## 2023-08-10 PROCEDURE — 1101F PT FALLS ASSESS-DOCD LE1/YR: CPT | Mod: CPTII,S$GLB,, | Performed by: INTERNAL MEDICINE

## 2023-08-10 PROCEDURE — 3008F BODY MASS INDEX DOCD: CPT | Mod: CPTII,S$GLB,, | Performed by: INTERNAL MEDICINE

## 2023-08-10 PROCEDURE — 3008F PR BODY MASS INDEX (BMI) DOCUMENTED: ICD-10-PCS | Mod: CPTII,S$GLB,, | Performed by: INTERNAL MEDICINE

## 2023-08-10 PROCEDURE — 1159F MED LIST DOCD IN RCRD: CPT | Mod: CPTII,S$GLB,, | Performed by: INTERNAL MEDICINE

## 2023-08-10 PROCEDURE — 1160F PR REVIEW ALL MEDS BY PRESCRIBER/CLIN PHARMACIST DOCUMENTED: ICD-10-PCS | Mod: CPTII,S$GLB,, | Performed by: INTERNAL MEDICINE

## 2023-08-10 PROCEDURE — 1126F PR PAIN SEVERITY QUANTIFIED, NO PAIN PRESENT: ICD-10-PCS | Mod: CPTII,S$GLB,, | Performed by: INTERNAL MEDICINE

## 2023-08-10 RX ORDER — PREGABALIN 50 MG/1
CAPSULE ORAL
Qty: 90 CAPSULE | Refills: 6 | Status: SHIPPED | OUTPATIENT
Start: 2023-08-10 | End: 2024-02-20

## 2023-09-11 DIAGNOSIS — E11.65 TYPE 2 DIABETES MELLITUS WITH HYPERGLYCEMIA, WITHOUT LONG-TERM CURRENT USE OF INSULIN: ICD-10-CM

## 2023-09-11 DIAGNOSIS — I10 HYPERTENSION, UNSPECIFIED TYPE: Primary | ICD-10-CM

## 2023-09-18 ENCOUNTER — OFFICE VISIT (OUTPATIENT)
Dept: INTERNAL MEDICINE | Facility: CLINIC | Age: 73
End: 2023-09-18
Payer: COMMERCIAL

## 2023-09-18 VITALS
BODY MASS INDEX: 25.16 KG/M2 | WEIGHT: 142 LBS | HEART RATE: 112 BPM | HEIGHT: 63 IN | DIASTOLIC BLOOD PRESSURE: 70 MMHG | OXYGEN SATURATION: 94 % | SYSTOLIC BLOOD PRESSURE: 138 MMHG

## 2023-09-18 DIAGNOSIS — E11.65 TYPE 2 DIABETES MELLITUS WITH HYPERGLYCEMIA, WITHOUT LONG-TERM CURRENT USE OF INSULIN: ICD-10-CM

## 2023-09-18 DIAGNOSIS — Z00.00 WELLNESS EXAMINATION: Primary | ICD-10-CM

## 2023-09-18 DIAGNOSIS — I10 HYPERTENSION, UNSPECIFIED TYPE: ICD-10-CM

## 2023-09-18 PROCEDURE — 3288F PR FALLS RISK ASSESSMENT DOCUMENTED: ICD-10-PCS | Mod: CPTII,,, | Performed by: STUDENT IN AN ORGANIZED HEALTH CARE EDUCATION/TRAINING PROGRAM

## 2023-09-18 PROCEDURE — 99397 PR PREVENTIVE VISIT,EST,65 & OVER: ICD-10-PCS | Mod: ,,, | Performed by: STUDENT IN AN ORGANIZED HEALTH CARE EDUCATION/TRAINING PROGRAM

## 2023-09-18 PROCEDURE — 3075F SYST BP GE 130 - 139MM HG: CPT | Mod: CPTII,,, | Performed by: STUDENT IN AN ORGANIZED HEALTH CARE EDUCATION/TRAINING PROGRAM

## 2023-09-18 PROCEDURE — 3075F PR MOST RECENT SYSTOLIC BLOOD PRESS GE 130-139MM HG: ICD-10-PCS | Mod: CPTII,,, | Performed by: STUDENT IN AN ORGANIZED HEALTH CARE EDUCATION/TRAINING PROGRAM

## 2023-09-18 PROCEDURE — 1160F PR REVIEW ALL MEDS BY PRESCRIBER/CLIN PHARMACIST DOCUMENTED: ICD-10-PCS | Mod: CPTII,,, | Performed by: STUDENT IN AN ORGANIZED HEALTH CARE EDUCATION/TRAINING PROGRAM

## 2023-09-18 PROCEDURE — 99397 PER PM REEVAL EST PAT 65+ YR: CPT | Mod: ,,, | Performed by: STUDENT IN AN ORGANIZED HEALTH CARE EDUCATION/TRAINING PROGRAM

## 2023-09-18 PROCEDURE — 3008F BODY MASS INDEX DOCD: CPT | Mod: CPTII,,, | Performed by: STUDENT IN AN ORGANIZED HEALTH CARE EDUCATION/TRAINING PROGRAM

## 2023-09-18 PROCEDURE — 3078F PR MOST RECENT DIASTOLIC BLOOD PRESSURE < 80 MM HG: ICD-10-PCS | Mod: CPTII,,, | Performed by: STUDENT IN AN ORGANIZED HEALTH CARE EDUCATION/TRAINING PROGRAM

## 2023-09-18 PROCEDURE — 1160F RVW MEDS BY RX/DR IN RCRD: CPT | Mod: CPTII,,, | Performed by: STUDENT IN AN ORGANIZED HEALTH CARE EDUCATION/TRAINING PROGRAM

## 2023-09-18 PROCEDURE — 1159F MED LIST DOCD IN RCRD: CPT | Mod: CPTII,,, | Performed by: STUDENT IN AN ORGANIZED HEALTH CARE EDUCATION/TRAINING PROGRAM

## 2023-09-18 PROCEDURE — 1101F PT FALLS ASSESS-DOCD LE1/YR: CPT | Mod: CPTII,,, | Performed by: STUDENT IN AN ORGANIZED HEALTH CARE EDUCATION/TRAINING PROGRAM

## 2023-09-18 PROCEDURE — 3008F PR BODY MASS INDEX (BMI) DOCUMENTED: ICD-10-PCS | Mod: CPTII,,, | Performed by: STUDENT IN AN ORGANIZED HEALTH CARE EDUCATION/TRAINING PROGRAM

## 2023-09-18 PROCEDURE — 1101F PR PT FALLS ASSESS DOC 0-1 FALLS W/OUT INJ PAST YR: ICD-10-PCS | Mod: CPTII,,, | Performed by: STUDENT IN AN ORGANIZED HEALTH CARE EDUCATION/TRAINING PROGRAM

## 2023-09-18 PROCEDURE — 3044F PR MOST RECENT HEMOGLOBIN A1C LEVEL <7.0%: ICD-10-PCS | Mod: CPTII,,, | Performed by: STUDENT IN AN ORGANIZED HEALTH CARE EDUCATION/TRAINING PROGRAM

## 2023-09-18 PROCEDURE — 3288F FALL RISK ASSESSMENT DOCD: CPT | Mod: CPTII,,, | Performed by: STUDENT IN AN ORGANIZED HEALTH CARE EDUCATION/TRAINING PROGRAM

## 2023-09-18 PROCEDURE — 1159F PR MEDICATION LIST DOCUMENTED IN MEDICAL RECORD: ICD-10-PCS | Mod: CPTII,,, | Performed by: STUDENT IN AN ORGANIZED HEALTH CARE EDUCATION/TRAINING PROGRAM

## 2023-09-18 PROCEDURE — 3078F DIAST BP <80 MM HG: CPT | Mod: CPTII,,, | Performed by: STUDENT IN AN ORGANIZED HEALTH CARE EDUCATION/TRAINING PROGRAM

## 2023-09-18 PROCEDURE — 3044F HG A1C LEVEL LT 7.0%: CPT | Mod: CPTII,,, | Performed by: STUDENT IN AN ORGANIZED HEALTH CARE EDUCATION/TRAINING PROGRAM

## 2023-09-18 RX ORDER — AMLODIPINE BESYLATE 5 MG/1
5 TABLET ORAL DAILY
Qty: 30 TABLET | Refills: 3 | Status: SHIPPED | OUTPATIENT
Start: 2023-09-18 | End: 2023-12-21

## 2023-09-18 RX ORDER — GLIMEPIRIDE 4 MG/1
4 TABLET ORAL DAILY
Qty: 30 TABLET | Refills: 3 | Status: SHIPPED | OUTPATIENT
Start: 2023-09-18 | End: 2023-12-21

## 2023-09-20 NOTE — ASSESSMENT & PLAN NOTE
Controlled  Continue current medications  Monitor BP, if syncope episode occurs again will check blood pressure at that time to rule out hypotension as cause

## 2023-09-20 NOTE — ASSESSMENT & PLAN NOTE
DXA scan: done 02/2023  Colonoscopy: done 06/2-16  Labs: reviewed in office, due for A1C ordered today

## 2023-09-20 NOTE — PROGRESS NOTES
Subjective:      Ileana Mathews  2023  [id number]      Chief Complaint: Annual Exam and Loss of Consciousness       HPI:  Ms Tejeda presents for annual wellness visit. Patient states had one syncope episode the other night, patient woke up to go to the bathroom and states passed out, when she regained consciousness it was hard for her to stand up and reach the bathroom. Denies urinary or fecal incontinence. Has not had any additional episodes. Patient did not check glucose or blood pressure at the time of episode. Patient states she is under stress since her  has recently been diagnosed with cirrhosis and will need a liver transplant.     Past Medical History:   Diagnosis Date    Anxiety/depression     Arthritis     Breast cancer     COPD (chronic obstructive pulmonary disease)     Diabetes mellitus     GERD (gastroesophageal reflux disease)     Hyperlipidemia     Hypertension     Panic attacks     Personal history of colonic polyps     SVT (supraventricular tachycardia)      Past Surgical History:   Procedure Laterality Date    BLADDER SUSPENSION      Colonoscooy  2018    HYSTERECTOMY      BSO    Mediport placement and removal      PNEUMONECTOMY Left     RHINOPLASTY      TONSILLECTOMY       Family History   Problem Relation Age of Onset    Breast cancer Mother         bilateral    Lung cancer Paternal Grandmother      Social History     Tobacco Use    Smoking status: Former     Current packs/day: 0.00     Average packs/day: 2.5 packs/day for 40.0 years (100.0 ttl pk-yrs)     Types: Cigarettes     Start date: 1978     Quit date: 2018     Years since quittin.8    Smokeless tobacco: Never   Substance and Sexual Activity    Alcohol use: Yes    Drug use: Never    Sexual activity: Not Currently     Review of patient's allergies indicates:  No Known Allergies    The following were reviewed at this visit: active problem list, medication list, allergies, family history, social history, and  health maintenance.    Medications:    Current Outpatient Medications:     albuterol (PROVENTIL/VENTOLIN HFA) 90 mcg/actuation inhaler, SMARTSI Puff(s) Via Inhaler Every 6 Hours, Disp: , Rfl:     ALPRAZolam (XANAX) 0.5 MG tablet, TAKE 1 TABLET (0.5 MG TOTAL) BY MOUTH 2 (TWO) TIMES DAILY AS NEEDED FOR ANXIETY., Disp: 30 tablet, Rfl: 0    anastrozole (ARIMIDEX) 1 mg Tab, TAKE 1 TABLET BY MOUTH ONCE DAILY, Disp: 30 tablet, Rfl: 6    b complex vitamins capsule, Take 1 capsule by mouth once daily., Disp: , Rfl:     esomeprazole (NEXIUM) 40 MG capsule, TAKE 1 CAPSULE BY MOUTH DAILY, Disp: 30 capsule, Rfl: 11    alendronate (FOSAMAX) 70 MG tablet, Take 1 tablet (70 mg total) by mouth every 7 days. (Patient not taking: Reported on 2023), Disp: 4 tablet, Rfl: 11    amLODIPine (NORVASC) 5 MG tablet, Take 1 tablet (5 mg total) by mouth once daily., Disp: 30 tablet, Rfl: 3    ANORO ELLIPTA 62.5-25 mcg/actuation DsDv, INHALE 1 PUFF INTO THE LUNGS ONCE DAILY. (Patient not taking: Reported on 2023), Disp: 60 each, Rfl: 3    azelastine-fluticasone (DYMISTA) 137-50 mcg/spray Spry nassal spray, 1 spray by Each Nostril route 2 (two) times daily., Disp: , Rfl:     glimepiride (AMARYL) 4 MG tablet, Take 1 tablet (4 mg total) by mouth once daily., Disp: 30 tablet, Rfl: 3    omega-3 fatty acids/fish oil (FISH OIL-OMEGA-3 FATTY ACIDS) 300-1,000 mg capsule, Take 1 g by mouth once daily., Disp: , Rfl:     pregabalin (LYRICA) 50 MG capsule, Take 1 pill every morning and 2 pills every evening (Patient not taking: Reported on 2023), Disp: 90 capsule, Rfl: 6      Medications have been reviewed and reconciled with patient at this visit.  Barriers to medications reviewed with patient.    Adverse reactions to current medications reviewed with patient..    Over the counter medications reviewed and reconciled with patient.  Review of Systems   Constitutional:  Negative for chills, fever, malaise/fatigue and weight loss.   HENT:   "Negative for congestion, ear discharge, ear pain, hearing loss, sinus pain and sore throat.    Eyes:  Negative for photophobia, pain, discharge and redness.   Respiratory:  Negative for cough, shortness of breath and wheezing.    Cardiovascular:  Negative for chest pain, palpitations and leg swelling.   Gastrointestinal:  Negative for constipation, diarrhea, heartburn, nausea and vomiting.   Genitourinary:  Negative for dysuria, frequency and urgency.   Musculoskeletal:  Negative for falls, joint pain and myalgias.   Skin:  Negative for itching and rash.   Neurological:  Negative for dizziness, focal weakness, weakness and headaches.   Psychiatric/Behavioral:  Negative for depression and memory loss. The patient is not nervous/anxious and does not have insomnia.            Objective:      Vitals:    09/18/23 1304   BP: 138/70   Pulse: (!) 112   SpO2: (!) 94%   Weight: 64.4 kg (142 lb)   Height: 5' 3" (1.6 m)       Physical Exam  Constitutional:       General: She is not in acute distress.     Appearance: Normal appearance.   HENT:      Head: Normocephalic and atraumatic.   Eyes:      Extraocular Movements: Extraocular movements intact.      Pupils: Pupils are equal, round, and reactive to light.   Cardiovascular:      Rate and Rhythm: Normal rate and regular rhythm.      Pulses: Normal pulses.      Heart sounds: Normal heart sounds. No murmur heard.     No friction rub. No gallop.   Pulmonary:      Effort: Pulmonary effort is normal.      Breath sounds: Normal breath sounds. No wheezing, rhonchi or rales.   Abdominal:      General: Abdomen is flat. Bowel sounds are normal. There is no distension.      Palpations: Abdomen is soft.      Tenderness: There is no abdominal tenderness.   Musculoskeletal:         General: No swelling or tenderness. Normal range of motion.      Cervical back: Normal range of motion and neck supple. No tenderness.      Right lower leg: No edema.      Left lower leg: No edema. "   Lymphadenopathy:      Cervical: No cervical adenopathy.   Skin:     Findings: No lesion or rash.   Neurological:      General: No focal deficit present.      Mental Status: She is alert and oriented to person, place, and time.      Cranial Nerves: No cranial nerve deficit.      Sensory: No sensory deficit.      Motor: No weakness.   Psychiatric:         Mood and Affect: Mood normal.         Behavior: Behavior normal.         Thought Content: Thought content normal.               Assessment and Plan:       1. Wellness examination  Assessment & Plan:  DXA scan: done 02/2023  Colonoscopy: done 06/2-16  Labs: reviewed in office, due for A1C ordered today         2. Type 2 diabetes mellitus with hyperglycemia, without long-term current use of insulin  Assessment & Plan:  Controlled per last A1C in March   Due for repeat A1C, ordered today  Continue current medications   If syncope episode occurs again, will check BG at that time     Orders:  -     Hemoglobin A1C; Future; Expected date: 09/18/2023    3. Hypertension, unspecified type  Assessment & Plan:  Controlled  Continue current medications  Monitor BP, if syncope episode occurs again will check blood pressure at that time to rule out hypotension as cause       Other orders  -     glimepiride (AMARYL) 4 MG tablet; Take 1 tablet (4 mg total) by mouth once daily.  Dispense: 30 tablet; Refill: 3  -     amLODIPine (NORVASC) 5 MG tablet; Take 1 tablet (5 mg total) by mouth once daily.  Dispense: 30 tablet; Refill: 3            Follow up: Follow up in about 3 months (around 12/18/2023) for Diabetes f/u.

## 2023-09-20 NOTE — ASSESSMENT & PLAN NOTE
Controlled per last A1C in March   Due for repeat A1C, ordered today  Continue current medications   If syncope episode occurs again, will check BG at that time

## 2023-10-23 DIAGNOSIS — C50.512 MALIGNANT NEOPLASM OF LOWER-OUTER QUADRANT OF LEFT FEMALE BREAST, UNSPECIFIED ESTROGEN RECEPTOR STATUS: ICD-10-CM

## 2023-10-23 RX ORDER — ANASTROZOLE 1 MG/1
TABLET ORAL
Qty: 30 TABLET | Refills: 3 | Status: SHIPPED | OUTPATIENT
Start: 2023-10-23 | End: 2023-10-26

## 2023-10-23 RX ORDER — ALPRAZOLAM 0.5 MG/1
0.5 TABLET ORAL 2 TIMES DAILY
Qty: 30 TABLET | Refills: 0 | Status: SHIPPED | OUTPATIENT
Start: 2023-10-23 | End: 2023-11-22

## 2023-10-26 DIAGNOSIS — C50.512 MALIGNANT NEOPLASM OF LOWER-OUTER QUADRANT OF LEFT FEMALE BREAST, UNSPECIFIED ESTROGEN RECEPTOR STATUS: ICD-10-CM

## 2023-10-26 RX ORDER — ANASTROZOLE 1 MG/1
TABLET ORAL
Qty: 30 TABLET | Refills: 6 | Status: SHIPPED | OUTPATIENT
Start: 2023-10-26

## 2023-11-22 RX ORDER — ALPRAZOLAM 0.5 MG/1
0.5 TABLET ORAL 2 TIMES DAILY
Qty: 30 TABLET | Refills: 0 | Status: SHIPPED | OUTPATIENT
Start: 2023-11-22 | End: 2023-12-21

## 2023-12-21 DIAGNOSIS — E11.65 TYPE 2 DIABETES MELLITUS WITH HYPERGLYCEMIA, WITHOUT LONG-TERM CURRENT USE OF INSULIN: Primary | ICD-10-CM

## 2023-12-21 DIAGNOSIS — I10 HYPERTENSION, UNSPECIFIED TYPE: ICD-10-CM

## 2023-12-21 RX ORDER — GLIMEPIRIDE 4 MG/1
4 TABLET ORAL DAILY
Qty: 30 TABLET | Refills: 6 | Status: SHIPPED | OUTPATIENT
Start: 2023-12-21

## 2023-12-21 RX ORDER — ALPRAZOLAM 0.5 MG/1
0.5 TABLET ORAL 2 TIMES DAILY
Qty: 30 TABLET | Refills: 0 | Status: SHIPPED | OUTPATIENT
Start: 2023-12-21 | End: 2024-01-23

## 2023-12-21 RX ORDER — AMLODIPINE BESYLATE 5 MG/1
5 TABLET ORAL DAILY
Qty: 30 TABLET | Refills: 6 | Status: SHIPPED | OUTPATIENT
Start: 2023-12-21

## 2023-12-25 PROBLEM — Z00.00 WELLNESS EXAMINATION: Status: RESOLVED | Noted: 2022-09-06 | Resolved: 2023-12-25

## 2024-01-23 RX ORDER — ALPRAZOLAM 0.5 MG/1
0.5 TABLET ORAL 2 TIMES DAILY
Qty: 30 TABLET | Refills: 0 | Status: SHIPPED | OUTPATIENT
Start: 2024-01-23 | End: 2024-02-28

## 2024-02-02 ENCOUNTER — PATIENT MESSAGE (OUTPATIENT)
Dept: ADMINISTRATIVE | Facility: HOSPITAL | Age: 74
End: 2024-02-02
Payer: COMMERCIAL

## 2024-02-16 ENCOUNTER — LAB VISIT (OUTPATIENT)
Dept: LAB | Facility: HOSPITAL | Age: 74
End: 2024-02-16
Attending: INTERNAL MEDICINE
Payer: COMMERCIAL

## 2024-02-16 DIAGNOSIS — C50.412 MALIGNANT NEOPLASM OF UPPER-OUTER QUADRANT OF LEFT BREAST IN FEMALE, ESTROGEN RECEPTOR POSITIVE: ICD-10-CM

## 2024-02-16 DIAGNOSIS — Z17.0 MALIGNANT NEOPLASM OF UPPER-OUTER QUADRANT OF LEFT BREAST IN FEMALE, ESTROGEN RECEPTOR POSITIVE: ICD-10-CM

## 2024-02-16 LAB
ALBUMIN SERPL-MCNC: 3.8 G/DL (ref 3.4–4.8)
ALBUMIN/GLOB SERPL: 1 RATIO (ref 1.1–2)
ALP SERPL-CCNC: 90 UNIT/L (ref 40–150)
ALT SERPL-CCNC: 14 UNIT/L (ref 0–55)
AST SERPL-CCNC: 22 UNIT/L (ref 5–34)
BASOPHILS # BLD AUTO: 0.06 X10(3)/MCL
BASOPHILS NFR BLD AUTO: 0.7 %
BILIRUB SERPL-MCNC: 0.3 MG/DL
BUN SERPL-MCNC: 18.4 MG/DL (ref 9.8–20.1)
CALCIUM SERPL-MCNC: 9.8 MG/DL (ref 8.4–10.2)
CEA SERPL-MCNC: 2.19 NG/ML (ref 0–3)
CHLORIDE SERPL-SCNC: 101 MMOL/L (ref 98–107)
CO2 SERPL-SCNC: 23 MMOL/L (ref 23–31)
CREAT SERPL-MCNC: 1.06 MG/DL (ref 0.55–1.02)
EOSINOPHIL # BLD AUTO: 0.19 X10(3)/MCL (ref 0–0.9)
EOSINOPHIL NFR BLD AUTO: 2.1 %
ERYTHROCYTE [DISTWIDTH] IN BLOOD BY AUTOMATED COUNT: 12.8 % (ref 11.5–17)
GFR SERPLBLD CREATININE-BSD FMLA CKD-EPI: 56 MLS/MIN/1.73/M2
GLOBULIN SER-MCNC: 3.7 GM/DL (ref 2.4–3.5)
GLUCOSE SERPL-MCNC: 280 MG/DL (ref 82–115)
HCT VFR BLD AUTO: 39.6 % (ref 37–47)
HGB BLD-MCNC: 12.9 G/DL (ref 12–16)
IMM GRANULOCYTES # BLD AUTO: 0.02 X10(3)/MCL (ref 0–0.04)
IMM GRANULOCYTES NFR BLD AUTO: 0.2 %
LYMPHOCYTES # BLD AUTO: 1.79 X10(3)/MCL (ref 0.6–4.6)
LYMPHOCYTES NFR BLD AUTO: 19.4 %
MCH RBC QN AUTO: 28.5 PG (ref 27–31)
MCHC RBC AUTO-ENTMCNC: 32.6 G/DL (ref 33–36)
MCV RBC AUTO: 87.4 FL (ref 80–94)
MONOCYTES # BLD AUTO: 0.55 X10(3)/MCL (ref 0.1–1.3)
MONOCYTES NFR BLD AUTO: 6 %
NEUTROPHILS # BLD AUTO: 6.62 X10(3)/MCL (ref 2.1–9.2)
NEUTROPHILS NFR BLD AUTO: 71.6 %
PLATELET # BLD AUTO: 307 X10(3)/MCL (ref 130–400)
PMV BLD AUTO: 10.1 FL (ref 7.4–10.4)
POTASSIUM SERPL-SCNC: 5.2 MMOL/L (ref 3.5–5.1)
PROT SERPL-MCNC: 7.5 GM/DL (ref 5.8–7.6)
RBC # BLD AUTO: 4.53 X10(6)/MCL (ref 4.2–5.4)
SODIUM SERPL-SCNC: 134 MMOL/L (ref 136–145)
WBC # SPEC AUTO: 9.23 X10(3)/MCL (ref 4.5–11.5)

## 2024-02-16 PROCEDURE — 86300 IMMUNOASSAY TUMOR CA 15-3: CPT

## 2024-02-16 PROCEDURE — 85025 COMPLETE CBC W/AUTO DIFF WBC: CPT

## 2024-02-16 PROCEDURE — 82378 CARCINOEMBRYONIC ANTIGEN: CPT

## 2024-02-16 PROCEDURE — 80053 COMPREHEN METABOLIC PANEL: CPT

## 2024-02-16 PROCEDURE — 36415 COLL VENOUS BLD VENIPUNCTURE: CPT

## 2024-02-19 LAB — CANCER AG27-29 SERPL-ACNC: 36.3 U/ML

## 2024-02-19 NOTE — PROGRESS NOTES
"Subjective:       Patient ID: Ileana Mathews is a 73 y.o. female.    Chief Complaint:  "Shortness of breath, increased stress at home"    Diagnosis: Recurrent metastatic breast cancer - ER low +/UT -, HER-2 negative (IHC 0)                    Solitary left lung met s/p left pneumonectomy 11/18                    S/p bilateral mastectomies                    Postmenopausal s/p hysterectomy        + COVID-19 vaccinated    Treatment History  Neoadjuvant TC x4 2008 --> B mastectomies --> TC x4 --> XRT --> Arimidex x 7 yrs    Current Treatment: Arimidex 1 mg daily (restarted 11/18)    Clinical History:  Patient was initially diagnosed with a stage III left breast cancer in 2008 with 2 out of 27 positive axillary lymph nodes. She was treated by Dr. Donald Foss in [place]. Her pathology was mixed ductal and lobular - ER +88%, UT +26% and HER-2 negative (IHC). She underwent neoadjuvant chemotherapy with 4 cycles of TC followed by bilateral mastectomies and 4 more cycles of TC. After completion of chemotherapy, she received adjuvant radiation therapy followed by Arimidex for 7 years. She transitioned to the [facility] 6/16 with Dr. Yan. Ongoing observation was recommended. A solitary pulmonary nodule was discovered on surveillance imaging in the left lung in 2018. Follow-up scan showed increased size of the nodule with no other findings of metastatic disease. PET scan showed no additional suspicious areas. She was referred to CV surgery for a planned wedge resection of the pulmonary nodule. Due to complications at the time of surgery, she required a left pneumonectomy. I do not have a copy of the operative report, but the patient stated there were bleeding complications due to a vascular injury. Surgical pathology showed a metastatic adenocarcinoma consistent with breast primary measuring 1.1 cm. Cells were positive for CK7, MIREYA-3, GCDFP15 and mammoglobin and negative for TTF-1, CK 20, CDX2 and " PAX-8. 10 resected lymph nodes were negative for involvement. Tumor was low ER +1.8%, MA negative and HER-2 negative (FISH). She was placed back on Arimidex 1 mg daily.    Surveillance CT of the chest 12/5/19 ([facility]) showed postoperative changes from previous left pneumonectomy and no findings suspicious for metastatic disease including the visualized upper abdomen. She was seen in the ER for lower abdominal pain 2/1/20. CT scan of the abdomen and pelvis without contrast showed a large amount of stool in the colon and rectum, cholelithiasis and a left renal cyst. CT scan of the chest 2/27/20 noted a 3 mm nodule in the right anterior lung which on retrospect was present and stable on the previous CT scan. Left pneumonectomy changes were stable with mediastinal shift. There were no other lesions suspicious for metastatic disease.    She was seen as a new patient at [facility] 5[facility]11[facility]20 to establish ongoing Oncology care and follow-up. She was still taking Arimidex 1 mg daily. She had no significant side effects related to the treatment. She was continued on Arimidex. Bone density exam 10/15/20 showed osteopenia of the AP spine, left and right hip with T scores of -1.0, -2.0 and -2.0 respectively.      CT C/A/P 8/23/21 ([facility]):  Changes from prior pneumonectomy with a small left pleural fluid collection and chronic emphysematous changes in the right lung.  There was a small, stable RUL nodule unchanged from previous exams.  There was no evidence of metastatic disease in the abdomen or pelvis.   CT C/A/P 2/16/22 ([facility]):  Sable changes from prior left pneumonectomy and a subcentimeter triangular focus in the right upper lobe unchanged from her previous exams.    CT C/A/P 2/24/23 ([facility]):  Previous left pneumonectomy with hyperexpanded right lung.  Encapsulated pleural effusion left hemithorax.  Mild subpleural interstitial reticulation and scarring of the right lung and two 5 mm nodules RUL  (described on previous scans).  A subcentimeter hypodensity in hepatic segment 6 was too small to characterize.  Outside films were not available for direct comparison.  CT C/A/P 8/2/23:  Stable postoperative changes and small RUL nodules.  No findings suspicious for disease recurrence.    Bone density exam 2/24/23 showed osteopenia of the lumbar spine with a T-score of -1.2 and both hips with a T-score of -2.2 on the left and -2.4 on the right.  She was started on weekly Fosamax.      Interval History   Mrs. Mathews is here today by herself for a six month breast cancer follow-up visit.  She continues on Arimidex with good tolerance.  She has chronic dyspnea to her previous left pneumonectomy and underlying COPD.  She denies any recent infections.  She does have inhalers, but is not followed by pulmonology.  Medical management is up-to-date.  She has chronic postmastectomy pain worse on the right and peripheral neuropathy.  Previous CT scans 8/23 showed no evidence of disease.  She is due for a follow-up bone density exam 2/25.  Laboratory for this visit was remarkable for hyperglycemia and mild renal insufficiency.  CBC and serum tumor markers were normal.  Results reviewed and discussed today with the patient.  Her  is currently undergoing a transplant evaluation.  She reports increased stress at home.  She uses THC gummies under the direction of the apothecary.     Review of Systems   Constitutional:  Negative for appetite change, fatigue, fever and unexpected weight change.   HENT:  Negative for mouth sores, sore throat and trouble swallowing.    Eyes: Negative.    Respiratory:  Positive for shortness of breath (URIBE). Negative for cough.    Cardiovascular:  Negative for chest pain, palpitations and leg swelling.   Gastrointestinal:  Negative for abdominal distention, abdominal pain, constipation, diarrhea and nausea.   Genitourinary:  Negative for dysuria, frequency and urgency.   Musculoskeletal:   Positive for arthralgias and neck pain. Negative for back pain.   Integumentary:  Negative for pallor and rash.   Neurological:  Negative for dizziness, weakness, numbness and headaches.   Hematological:  Negative for adenopathy. Does not bruise/bleed easily.   Psychiatric/Behavioral: Negative.         PMHx:  HTN, hyperlipidemia, diabetes mellitus, SVT, COPD, GERD, arthritis, anxiety/depression, panic attacks  PSHx:  Tonsils, bladder suspension, hysterectomy/BSO, rhinoplasty, Mediport placement and removal, left pneumonectomy  SH:  Former smoker 1-2.5 PPD x 40 yrs, quit 11/18. Rare alcohol use. Lives in [place] with her , retired dispatcher for the [employer]  FH:  Her mother had bilateral breast cancer, paternal grandmother had lung cancer.     Objective:     Vitals:    02/20/24 1054   BP: 128/79   Pulse: 76   Resp: 18   Temp: 98 °F (36.7 °C)         Physical Exam  Constitutional:       Comments: Elderly, well-developed white female in NAD   HENT:      Head: Normocephalic.      Mouth/Throat:      Mouth: Mucous membranes are moist.      Pharynx: Oropharynx is clear. No posterior oropharyngeal erythema.   Eyes:      Extraocular Movements: Extraocular movements intact.      Conjunctiva/sclera: Conjunctivae normal.      Pupils: Pupils are equal, round, and reactive to light.   Cardiovascular:      Rate and Rhythm: Normal rate and regular rhythm.      Heart sounds: No murmur heard.  Pulmonary:      Comments: Well-healed left thoracotomy incision.  Absent breath sounds on the left, clear on the right.  Chest:      Comments: Well-healed bilateral mastectomy incisions.  No palpable masses, skin changes or axillary nodes bilaterally.  Abdominal:      General: Bowel sounds are normal. There is no distension.      Palpations: Abdomen is soft.      Tenderness: There is no abdominal tenderness.   Musculoskeletal:         General: No swelling or tenderness. Normal range of motion.      Cervical back: Neck supple. No  tenderness.   Skin:     General: Skin is warm and dry.      Findings: No rash.   Neurological:      General: No focal deficit present.      Mental Status: She is oriented to person, place, and time.      Cranial Nerves: No cranial nerve deficit.      Motor: No weakness.       ECOG SCORE    2 - Capable of all selfcare but unable to carry out any work activities, active > 50% of hours        LABORATORY  Lab Results   Component Value Date/Time    WBC 9.23 02/16/2024 11:15 AM    RBC 4.53 02/16/2024 11:15 AM    HGB 12.9 02/16/2024 11:15 AM    HCT 39.6 02/16/2024 11:15 AM     02/16/2024 11:15 AM    ABSNEUTRO 6.62 02/16/2024 11:15 AM    NEUTROAUTO 71.6 02/16/2024 11:15 AM    ABSLYMPH 1.79 02/16/2024 11:15 AM    LYMPHAUTO 19.4 02/16/2024 11:15 AM    ABSEOS 0.19 02/16/2024 11:15 AM    EOSAUTO 2.1 02/16/2024 11:15 AM    ABSBASO 0.06 02/16/2024 11:15 AM    BASOPHILAUTO 0.7 02/16/2024 11:15 AM        Chemistry        Component Value Date/Time     (L) 02/16/2024 1115    K 5.2 (H) 02/16/2024 1115    CO2 23 02/16/2024 1115    BUN 18.4 02/16/2024 1115    CREATININE 1.06 (H) 02/16/2024 1115        Component Value Date/Time    CALCIUM 9.8 02/16/2024 1115    ALKPHOS 90 02/16/2024 1115    AST 22 02/16/2024 1115    ALT 14 02/16/2024 1115    BILITOT 0.3 02/16/2024 1115    EGFRNONAA 46 05/27/2022 1314         Latest Reference Range & Units 02/16/24 11:15   Breast Carcinoma Assoc Ag(CA 27.29) <=38.0 U/mL 36.3      Latest Reference Range & Units 02/16/24 11:15   CEA 0.00 - 3.00 ng/mL 2.19         Assessment:   Recurrent metastatic breast cancer - initial diagnosis 2008, ER/SD +, HER-2 negative  Solitary pulmonary met s/p left pneumonectomy 11/18 - ER low +, SD negative, HER-2 negative (IHC 0)  S/p bilateral mastectomies  Postmenopausal s/p hysterectomy  Osteopenia      Plan:   Continue endocrine therapy with Arimidex.    Patient has no clinical findings or laboratory findings worrisome for progressive disease.    Continue  Fosamax for treatment of osteopenia.    Return to clinic in 6 months for follow-up with full laboratory, including tumor markers, and surveillance CT scans of the chest, abdomen and pelvis.    Repeat bone density exam in 1 year.    All questions answered to the satisfaction of the patient.    ANGEL OLSON, FNP-C    Other Physicians  Dr. Josey Gilbert

## 2024-02-20 ENCOUNTER — OFFICE VISIT (OUTPATIENT)
Dept: HEMATOLOGY/ONCOLOGY | Facility: CLINIC | Age: 74
End: 2024-02-20
Payer: COMMERCIAL

## 2024-02-20 VITALS
HEIGHT: 63 IN | RESPIRATION RATE: 18 BRPM | TEMPERATURE: 98 F | WEIGHT: 155 LBS | OXYGEN SATURATION: 97 % | HEART RATE: 76 BPM | BODY MASS INDEX: 27.46 KG/M2 | DIASTOLIC BLOOD PRESSURE: 79 MMHG | SYSTOLIC BLOOD PRESSURE: 128 MMHG

## 2024-02-20 DIAGNOSIS — C78.02 SECONDARY MALIGNANT NEOPLASM OF LEFT LUNG: ICD-10-CM

## 2024-02-20 DIAGNOSIS — R06.09 CHRONIC DYSPNEA: ICD-10-CM

## 2024-02-20 DIAGNOSIS — C50.412 MALIGNANT NEOPLASM OF UPPER-OUTER QUADRANT OF LEFT BREAST IN FEMALE, ESTROGEN RECEPTOR POSITIVE: Primary | ICD-10-CM

## 2024-02-20 DIAGNOSIS — Z17.0 MALIGNANT NEOPLASM OF UPPER-OUTER QUADRANT OF LEFT BREAST IN FEMALE, ESTROGEN RECEPTOR POSITIVE: Primary | ICD-10-CM

## 2024-02-20 PROCEDURE — 3008F BODY MASS INDEX DOCD: CPT | Mod: CPTII,S$GLB,, | Performed by: NURSE PRACTITIONER

## 2024-02-20 PROCEDURE — 1126F AMNT PAIN NOTED NONE PRSNT: CPT | Mod: CPTII,S$GLB,, | Performed by: NURSE PRACTITIONER

## 2024-02-20 PROCEDURE — 3078F DIAST BP <80 MM HG: CPT | Mod: CPTII,S$GLB,, | Performed by: NURSE PRACTITIONER

## 2024-02-20 PROCEDURE — 99999 PR PBB SHADOW E&M-EST. PATIENT-LVL IV: CPT | Mod: PBBFAC,,, | Performed by: NURSE PRACTITIONER

## 2024-02-20 PROCEDURE — 3288F FALL RISK ASSESSMENT DOCD: CPT | Mod: CPTII,S$GLB,, | Performed by: NURSE PRACTITIONER

## 2024-02-20 PROCEDURE — 1160F RVW MEDS BY RX/DR IN RCRD: CPT | Mod: CPTII,S$GLB,, | Performed by: NURSE PRACTITIONER

## 2024-02-20 PROCEDURE — 1159F MED LIST DOCD IN RCRD: CPT | Mod: CPTII,S$GLB,, | Performed by: NURSE PRACTITIONER

## 2024-02-20 PROCEDURE — 1101F PT FALLS ASSESS-DOCD LE1/YR: CPT | Mod: CPTII,S$GLB,, | Performed by: NURSE PRACTITIONER

## 2024-02-20 PROCEDURE — 99214 OFFICE O/P EST MOD 30 MIN: CPT | Mod: S$GLB,,, | Performed by: NURSE PRACTITIONER

## 2024-02-20 PROCEDURE — 3074F SYST BP LT 130 MM HG: CPT | Mod: CPTII,S$GLB,, | Performed by: NURSE PRACTITIONER

## 2024-02-23 DIAGNOSIS — M85.89 OSTEOPENIA OF MULTIPLE SITES: ICD-10-CM

## 2024-02-26 RX ORDER — ALENDRONATE SODIUM 70 MG/1
70 TABLET ORAL
Qty: 4 TABLET | Refills: 11 | Status: SHIPPED | OUTPATIENT
Start: 2024-02-26 | End: 2025-02-25

## 2024-02-28 RX ORDER — ALPRAZOLAM 0.5 MG/1
0.5 TABLET ORAL 2 TIMES DAILY
Qty: 30 TABLET | Refills: 0 | Status: SHIPPED | OUTPATIENT
Start: 2024-02-28 | End: 2024-04-24 | Stop reason: SDUPTHER

## 2024-04-24 RX ORDER — ESOMEPRAZOLE MAGNESIUM 40 MG/1
40 CAPSULE, DELAYED RELEASE ORAL
Qty: 30 CAPSULE | Refills: 11 | OUTPATIENT
Start: 2024-04-24

## 2024-04-24 RX ORDER — ALPRAZOLAM 0.5 MG/1
0.5 TABLET ORAL 2 TIMES DAILY
Qty: 60 TABLET | Refills: 0 | Status: SHIPPED | OUTPATIENT
Start: 2024-04-24

## 2024-04-24 NOTE — TELEPHONE ENCOUNTER
----- Message from Sonja Tidwell sent at 4/24/2024 11:39 AM CDT -----  Regarding: refill  Type:  RX Refill Request    Who Called: merry samayoa    RX Name and Strength:ALPRAZolam (XANAX) 0.5 MG tablet     Preferred Pharmacy with phone number:[facility] Call Back Number:[phone]  Additional Information: stated that the pts  is having a liver transplants and is needing to leave for 2 this afternoon and is needing a refill before they leave. Please advise

## 2024-05-10 ENCOUNTER — TELEPHONE (OUTPATIENT)
Dept: HEMATOLOGY/ONCOLOGY | Facility: CLINIC | Age: 74
End: 2024-05-10
Payer: COMMERCIAL

## 2024-07-03 ENCOUNTER — TELEPHONE (OUTPATIENT)
Dept: HEMATOLOGY/ONCOLOGY | Facility: CLINIC | Age: 74
End: 2024-07-03
Payer: COMMERCIAL

## 2024-07-03 DIAGNOSIS — C50.512 MALIGNANT NEOPLASM OF LOWER-OUTER QUADRANT OF LEFT FEMALE BREAST, UNSPECIFIED ESTROGEN RECEPTOR STATUS: ICD-10-CM

## 2024-07-03 RX ORDER — ANASTROZOLE 1 MG/1
1 TABLET ORAL DAILY
Qty: 30 TABLET | Refills: 1 | Status: SHIPPED | OUTPATIENT
Start: 2024-07-03

## 2024-07-03 NOTE — TELEPHONE ENCOUNTER
HAZEL -     Pharmacist at [facility] called in to have patient's rx refilled. I informed her that patient needed to contact us to make an appointment before rx could be sent in. If you could please let me know when she calls in to schedule. I will send in that refill once the appointment has been made.

## 2024-07-17 ENCOUNTER — HOSPITAL ENCOUNTER (OUTPATIENT)
Dept: RADIOLOGY | Facility: HOSPITAL | Age: 74
Discharge: HOME | End: 2024-07-17
Attending: NURSE PRACTITIONER
Payer: COMMERCIAL

## 2024-07-17 DIAGNOSIS — C50.412 MALIGNANT NEOPLASM OF UPPER-OUTER QUADRANT OF LEFT BREAST IN FEMALE, ESTROGEN RECEPTOR POSITIVE: ICD-10-CM

## 2024-07-17 DIAGNOSIS — C78.02 SECONDARY MALIGNANT NEOPLASM OF LEFT LUNG: ICD-10-CM

## 2024-07-17 DIAGNOSIS — Z17.0 MALIGNANT NEOPLASM OF UPPER-OUTER QUADRANT OF LEFT BREAST IN FEMALE, ESTROGEN RECEPTOR POSITIVE: ICD-10-CM

## 2024-07-17 LAB
CREAT SERPL-MCNC: 0.9 MG/DL (ref 0.5–1.4)
SAMPLE: NORMAL

## 2024-07-17 PROCEDURE — 71260 CT THORAX DX C+: CPT | Mod: TC

## 2024-07-17 PROCEDURE — 25500020 PHARM REV CODE 255: Performed by: NURSE PRACTITIONER

## 2024-07-17 RX ADMIN — IOHEXOL 100 ML: 350 INJECTION, SOLUTION INTRAVENOUS at 02:07

## 2024-07-17 RX ADMIN — DIATRIZOATE MEGLUMINE AND DIATRIZOATE SODIUM 30 ML: 660; 100 LIQUID ORAL; RECTAL at 02:07

## 2024-07-22 ENCOUNTER — TELEPHONE (OUTPATIENT)
Dept: INTERNAL MEDICINE | Facility: CLINIC | Age: 74
End: 2024-07-22
Payer: COMMERCIAL

## 2024-07-23 NOTE — PROGRESS NOTES
Subjective:       Patient ID: Ileana Mathews is a 73 y.o. female.    Chief Complaint:  Mild joint pain    Diagnosis: Recurrent metastatic breast cancer - ER low +/TN -, HER-2 negative (IHC 0)                    Solitary left lung met s/p left pneumonectomy 11/18                    S/p bilateral mastectomies                    Postmenopausal s/p hysterectomy    Treatment History  Neoadjuvant TC x4 2008 --> B mastectomies --> TC x4 --> XRT --> Anastrozole x 7 yrs    Current Treatment:  Anastrozole 1 mg daily (restarted 11/18)    Clinical History:  Patient was initially diagnosed with a stage III left breast cancer in 2008 with 2 out of 27 positive axillary lymph nodes. She was treated by Dr. Donald Foss in [place]. Her pathology was mixed ductal and lobular - ER +88%, TN +26% and HER-2 negative (IHC). She underwent neoadjuvant chemotherapy with 4 cycles of TC followed by bilateral mastectomies and 4 more cycles of TC. After completion of chemotherapy, she received adjuvant radiation therapy followed by Arimidex for 7 years. She transitioned to the [facility] 6/16 with Dr. Yan. Ongoing observation was recommended. A solitary pulmonary nodule was discovered on surveillance imaging in the left lung in 2018. Follow-up scan showed increased size of the nodule with no other findings of metastatic disease. PET scan showed no additional suspicious areas. She was referred to CV surgery for a planned wedge resection of the pulmonary nodule. Due to complications at the time of surgery, she required a left pneumonectomy. I do not have a copy of the operative report, but the patient stated there were bleeding complications due to a vascular injury. Surgical pathology showed a metastatic adenocarcinoma consistent with breast primary measuring 1.1 cm. Cells were positive for CK7, MIREYA-3, GCDFP15 and mammoglobin and negative for TTF-1, CK 20, CDX2 and PAX-8. 10 resected lymph nodes were negative for  involvement. Tumor was low ER +1.8%, IN negative and HER-2 negative (FISH). She was placed back on Arimidex 1 mg daily.    Surveillance CT of the chest 12/5/19 ([facility]) showed postoperative changes from previous left pneumonectomy and no findings suspicious for metastatic disease including the visualized upper abdomen. She was seen in the ER for lower abdominal pain 2/1/20. CT scan of the abdomen and pelvis without contrast showed a large amount of stool in the colon and rectum, cholelithiasis and a left renal cyst. CT scan of the chest 2/27/20 noted a 3 mm nodule in the right anterior lung which on retrospect was present and stable on the previous CT scan. Left pneumonectomy changes were stable with mediastinal shift. There were no other lesions suspicious for metastatic disease.    She was seen as a new patient [facility] 5/11/20 to establish ongoing Oncology care and follow-up. She was still taking Arimidex 1 mg daily. She had no significant side effects related to the treatment. She was continued on Arimidex. Bone density exam 10/15/20 showed osteopenia of the AP spine, left and right hip with T scores of -1.0, -2.0 and -2.0 respectively.      CT C/A/P 8/23/21 ([facility]):  Changes from prior pneumonectomy with a small left pleural fluid collection and chronic emphysematous changes in the right lung.  There was a small, stable RUL nodule unchanged from previous exams.  There was no evidence of metastatic disease in the abdomen or pelvis.   CT C/A/P 2/16/22 ([facility]):  Sable changes from prior left pneumonectomy and a subcentimeter triangular focus in the right upper lobe unchanged from her previous exams.    CT C/A/P 2/24/23 ([facility]):  Previous left pneumonectomy with hyperexpanded right lung.  Encapsulated pleural effusion left hemithorax.  Mild subpleural interstitial reticulation and scarring of the right lung and two 5 mm nodules RUL (described on previous scans).  A subcentimeter  hypodensity in hepatic segment 6 was too small to characterize.  Outside films were not available for direct comparison.  CT C/A/P 8/2/23:  Stable postoperative changes and small RUL nodules.  No findings suspicious for disease recurrence.  DEXA Bone Density 2/24/23:  Osteopenia L-spine, T-score -1.2, left hip -2.2, right hip -2.4.  CT C/A/P 7/17/24:  Stable postoperative changes from left pneumonectomy and bilateral mastectomies.  No evidence of metastatic disease.       Interval History   She returns to the office today for a six-month surveillance visit accompanied by her .  He is recovering from a liver transplant approximately 3 months ago.  She remains on hormone suppressive therapy with anastrozole following her left pneumonectomy.  She continues to tolerate therapy well.  She has minor joint pain.  No significant hot flashes.  She has no signs or symptoms suspicious for disease recurrence.  CT scans of the chest, abdomen and pelvis 7/17/24 showed no evidence of metastatic disease.  She remains on weekly Fosamax for treatment of her bone density.       Review of Systems   Constitutional:  Negative for appetite change, fatigue, fever and unexpected weight change.   HENT:  Negative for mouth sores, sore throat and trouble swallowing.    Eyes: Negative.    Respiratory:  Positive for shortness of breath (URIBE). Negative for cough.    Cardiovascular:  Negative for chest pain, palpitations and leg swelling.   Gastrointestinal:  Negative for abdominal distention, abdominal pain, constipation, diarrhea and nausea.   Genitourinary:  Negative for dysuria, frequency and urgency.   Musculoskeletal:  Positive for arthralgias and neck pain. Negative for back pain.   Integumentary:  Negative for pallor and rash.   Neurological:  Negative for dizziness, weakness, numbness and headaches.   Hematological:  Negative for adenopathy. Does not bruise/bleed easily.   Psychiatric/Behavioral: Negative.         PMHx:  HTN,  hyperlipidemia, diabetes mellitus, SVT, COPD, GERD, arthritis, anxiety/depression, panic attacks  PSHx:  Tonsils, bladder suspension, hysterectomy/BSO, rhinoplasty, Mediport placement and removal, left pneumonectomy  SH:  Former smoker 1-2.5 PPD x 40 yrs, quit 11/18. Rare alcohol use. 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     Objective:     Vitals:    07/29/24 1502   BP: (!) 180/76   Pulse: 82   Resp: 16   Temp: 98.2 °F (36.8 °C)       Physical Exam  Constitutional:       Comments: Elderly, well-developed white female in NAD   HENT:      Head: Normocephalic.      Mouth/Throat:      Mouth: Mucous membranes are moist.      Pharynx: Oropharynx is clear. No posterior oropharyngeal erythema.   Eyes:      Extraocular Movements: Extraocular movements intact.      Conjunctiva/sclera: Conjunctivae normal.      Pupils: Pupils are equal, round, and reactive to light.   Cardiovascular:      Rate and Rhythm: Normal rate and regular rhythm.      Heart sounds: No murmur heard.  Pulmonary:      Comments: Well-healed left thoracotomy incision.  Absent breath sounds on the left, clear on the right.  Chest:      Comments: Well-healed bilateral mastectomy incisions.  No palpable masses, skin changes or axillary nodes bilaterally.  Abdominal:      General: Bowel sounds are normal. There is no distension.      Palpations: Abdomen is soft.      Tenderness: There is no abdominal tenderness.   Musculoskeletal:         General: No swelling or tenderness. Normal range of motion.      Cervical back: Neck supple. No tenderness.   Skin:     General: Skin is warm and dry.      Findings: No rash.   Neurological:      General: No focal deficit present.      Mental Status: She is oriented to person, place, and time.      Cranial Nerves: No cranial nerve deficit.      Motor: No weakness.       ECOG SCORE    1 - Restricted in  strenuous activity-ambulatory and able to carry out work of a light nature        LABORATORY  Lab Results   Component Value Date/Time    WBC 9.23 02/16/2024 11:15 AM    RBC 4.53 02/16/2024 11:15 AM    HGB 12.9 02/16/2024 11:15 AM    HCT 39.6 02/16/2024 11:15 AM     02/16/2024 11:15 AM    ABSNEUTRO 6.62 02/16/2024 11:15 AM    NEUTROAUTO 71.6 02/16/2024 11:15 AM    ABSLYMPH 1.79 02/16/2024 11:15 AM    LYMPHAUTO 19.4 02/16/2024 11:15 AM    ABSEOS 0.19 02/16/2024 11:15 AM    EOSAUTO 2.1 02/16/2024 11:15 AM    ABSBASO 0.06 02/16/2024 11:15 AM    BASOPHILAUTO 0.7 02/16/2024 11:15 AM        Chemistry        Component Value Date/Time     (L) 02/16/2024 1115    K 5.2 (H) 02/16/2024 1115     02/16/2024 1115    CO2 23 02/16/2024 1115    BUN 18.4 02/16/2024 1115    CREATININE 1.06 (H) 02/16/2024 1115        Component Value Date/Time    CALCIUM 9.8 02/16/2024 1115    ALKPHOS 90 02/16/2024 1115    AST 22 02/16/2024 1115    ALT 14 02/16/2024 1115    BILITOT 0.3 02/16/2024 1115    EGFRNONAA 46 05/27/2022 1314            Assessment:   Recurrent metastatic breast cancer - initial diagnosis 2008, ER/SD +, HER-2 negative  Solitary pulmonary met s/p left pneumonectomy 11/18 - ER low +, SD negative, HER-2 negative (IHC 0)  S/p bilateral mastectomies  Postmenopausal s/p hysterectomy  Osteopenia      Plan:   Patient continues to do well with no clinical or radiographic evidence of disease recurrence.  Continue treatment with anastrozole, tolerating well.  Continue weekly Fosamax for treatment of osteopenia.  RTC in 6 months for a follow-up visit and clinical exam with a repeat bone density exam.  No indication for additional surveillance imaging unless she develops new symptoms, abnormal physical exam findings or abnormal laboratory.      ESPERANZA KAHN MD    Other Physicians  Dr. Kings Gilbert

## 2024-07-26 DIAGNOSIS — C50.512 MALIGNANT NEOPLASM OF LOWER-OUTER QUADRANT OF LEFT FEMALE BREAST, UNSPECIFIED ESTROGEN RECEPTOR STATUS: ICD-10-CM

## 2024-07-29 ENCOUNTER — OFFICE VISIT (OUTPATIENT)
Dept: HEMATOLOGY/ONCOLOGY | Facility: CLINIC | Age: 74
End: 2024-07-29
Payer: COMMERCIAL

## 2024-07-29 VITALS
HEART RATE: 82 BPM | WEIGHT: 142.19 LBS | RESPIRATION RATE: 16 BRPM | SYSTOLIC BLOOD PRESSURE: 180 MMHG | TEMPERATURE: 98 F | OXYGEN SATURATION: 97 % | BODY MASS INDEX: 25.2 KG/M2 | HEIGHT: 63 IN | DIASTOLIC BLOOD PRESSURE: 76 MMHG

## 2024-07-29 DIAGNOSIS — M85.80 OSTEOPENIA, UNSPECIFIED LOCATION: ICD-10-CM

## 2024-07-29 DIAGNOSIS — C50.512 MALIGNANT NEOPLASM OF LOWER-OUTER QUADRANT OF LEFT FEMALE BREAST, UNSPECIFIED ESTROGEN RECEPTOR STATUS: Primary | ICD-10-CM

## 2024-07-29 PROCEDURE — 99999 PR PBB SHADOW E&M-EST. PATIENT-LVL IV: CPT | Mod: PBBFAC,,, | Performed by: INTERNAL MEDICINE

## 2024-07-29 PROCEDURE — 3078F DIAST BP <80 MM HG: CPT | Mod: CPTII,S$GLB,, | Performed by: INTERNAL MEDICINE

## 2024-07-29 PROCEDURE — 3008F BODY MASS INDEX DOCD: CPT | Mod: CPTII,S$GLB,, | Performed by: INTERNAL MEDICINE

## 2024-07-29 PROCEDURE — 1159F MED LIST DOCD IN RCRD: CPT | Mod: CPTII,S$GLB,, | Performed by: INTERNAL MEDICINE

## 2024-07-29 PROCEDURE — 1101F PT FALLS ASSESS-DOCD LE1/YR: CPT | Mod: CPTII,S$GLB,, | Performed by: INTERNAL MEDICINE

## 2024-07-29 PROCEDURE — 3077F SYST BP >= 140 MM HG: CPT | Mod: CPTII,S$GLB,, | Performed by: INTERNAL MEDICINE

## 2024-07-29 PROCEDURE — 1126F AMNT PAIN NOTED NONE PRSNT: CPT | Mod: CPTII,S$GLB,, | Performed by: INTERNAL MEDICINE

## 2024-07-29 PROCEDURE — 1160F RVW MEDS BY RX/DR IN RCRD: CPT | Mod: CPTII,S$GLB,, | Performed by: INTERNAL MEDICINE

## 2024-07-29 PROCEDURE — 4010F ACE/ARB THERAPY RXD/TAKEN: CPT | Mod: CPTII,S$GLB,, | Performed by: INTERNAL MEDICINE

## 2024-07-29 PROCEDURE — 3288F FALL RISK ASSESSMENT DOCD: CPT | Mod: CPTII,S$GLB,, | Performed by: INTERNAL MEDICINE

## 2024-07-29 PROCEDURE — 99214 OFFICE O/P EST MOD 30 MIN: CPT | Mod: S$GLB,,, | Performed by: INTERNAL MEDICINE

## 2024-07-29 RX ORDER — ALENDRONATE SODIUM 70 MG/1
70 TABLET ORAL
Qty: 4 TABLET | Refills: 5 | Status: SHIPPED | OUTPATIENT
Start: 2024-07-29 | End: 2025-01-13

## 2024-07-29 RX ORDER — ANASTROZOLE 1 MG/1
1 TABLET ORAL
Qty: 30 TABLET | Refills: 1 | Status: SHIPPED | OUTPATIENT
Start: 2024-07-29

## 2024-07-29 RX ORDER — TRAZODONE HYDROCHLORIDE 50 MG/1
50 TABLET ORAL
COMMUNITY
Start: 2024-07-11

## 2024-07-29 RX ORDER — DESVENLAFAXINE SUCCINATE 25 MG/1
1 TABLET, EXTENDED RELEASE ORAL EVERY MORNING
COMMUNITY

## 2024-07-29 RX ORDER — DULAGLUTIDE 1.5 MG/.5ML
1.5 INJECTION, SOLUTION SUBCUTANEOUS
COMMUNITY
Start: 2024-07-15 | End: 2025-01-11

## 2024-09-23 DIAGNOSIS — C50.512 MALIGNANT NEOPLASM OF LOWER-OUTER QUADRANT OF LEFT FEMALE BREAST, UNSPECIFIED ESTROGEN RECEPTOR STATUS: ICD-10-CM

## 2024-09-23 RX ORDER — ANASTROZOLE 1 MG/1
1 TABLET ORAL
Qty: 30 TABLET | Refills: 4 | Status: SHIPPED | OUTPATIENT
Start: 2024-09-23

## 2024-12-27 DIAGNOSIS — M85.80 OSTEOPENIA, UNSPECIFIED LOCATION: ICD-10-CM

## 2024-12-27 RX ORDER — ALENDRONATE SODIUM 70 MG/1
70 TABLET ORAL
Qty: 4 TABLET | Refills: 5 | Status: SHIPPED | OUTPATIENT
Start: 2024-12-27 | End: 2025-06-13

## 2025-01-24 ENCOUNTER — LAB VISIT (OUTPATIENT)
Dept: LAB | Facility: HOSPITAL | Age: 75
End: 2025-01-24
Attending: INTERNAL MEDICINE
Payer: MEDICARE

## 2025-01-24 DIAGNOSIS — C50.512 MALIGNANT NEOPLASM OF LOWER-OUTER QUADRANT OF LEFT FEMALE BREAST, UNSPECIFIED ESTROGEN RECEPTOR STATUS: ICD-10-CM

## 2025-01-24 LAB
ALBUMIN SERPL-MCNC: 4.3 G/DL (ref 3.4–4.8)
ALBUMIN/GLOB SERPL: 0.9 RATIO (ref 1.1–2)
ALP SERPL-CCNC: 68 UNIT/L (ref 40–150)
ALT SERPL-CCNC: 7 UNIT/L (ref 0–55)
ANION GAP SERPL CALC-SCNC: 18 MEQ/L
AST SERPL-CCNC: 18 UNIT/L (ref 5–34)
BASOPHILS # BLD AUTO: 0.05 X10(3)/MCL
BASOPHILS NFR BLD AUTO: 0.5 %
BILIRUB SERPL-MCNC: 0.3 MG/DL
BUN SERPL-MCNC: 22.6 MG/DL (ref 9.8–20.1)
CALCIUM SERPL-MCNC: 10.8 MG/DL (ref 8.4–10.2)
CEA SERPL-MCNC: 2.59 NG/ML (ref 0–3)
CHLORIDE SERPL-SCNC: 102 MMOL/L (ref 98–107)
CO2 SERPL-SCNC: 22 MMOL/L (ref 23–31)
CREAT SERPL-MCNC: 1.24 MG/DL (ref 0.55–1.02)
CREAT/UREA NIT SERPL: 18
EOSINOPHIL # BLD AUTO: 0.2 X10(3)/MCL (ref 0–0.9)
EOSINOPHIL NFR BLD AUTO: 2.2 %
ERYTHROCYTE [DISTWIDTH] IN BLOOD BY AUTOMATED COUNT: 13.7 % (ref 11.5–17)
GFR SERPLBLD CREATININE-BSD FMLA CKD-EPI: 46 ML/MIN/1.73/M2
GLOBULIN SER-MCNC: 4.6 GM/DL (ref 2.4–3.5)
GLUCOSE SERPL-MCNC: 138 MG/DL (ref 82–115)
HCT VFR BLD AUTO: 41.5 % (ref 37–47)
HGB BLD-MCNC: 13.2 G/DL (ref 12–16)
IMM GRANULOCYTES # BLD AUTO: 0.03 X10(3)/MCL (ref 0–0.04)
IMM GRANULOCYTES NFR BLD AUTO: 0.3 %
LYMPHOCYTES # BLD AUTO: 1.96 X10(3)/MCL (ref 0.6–4.6)
LYMPHOCYTES NFR BLD AUTO: 21.4 %
MCH RBC QN AUTO: 29.5 PG (ref 27–31)
MCHC RBC AUTO-ENTMCNC: 31.8 G/DL (ref 33–36)
MCV RBC AUTO: 92.6 FL (ref 80–94)
MONOCYTES # BLD AUTO: 0.44 X10(3)/MCL (ref 0.1–1.3)
MONOCYTES NFR BLD AUTO: 4.8 %
NEUTROPHILS # BLD AUTO: 6.47 X10(3)/MCL (ref 2.1–9.2)
NEUTROPHILS NFR BLD AUTO: 70.8 %
PLATELET # BLD AUTO: 275 X10(3)/MCL (ref 130–400)
PMV BLD AUTO: 10.6 FL (ref 7.4–10.4)
POTASSIUM SERPL-SCNC: 5.3 MMOL/L (ref 3.5–5.1)
PROT SERPL-MCNC: 8.9 GM/DL (ref 5.8–7.6)
RBC # BLD AUTO: 4.48 X10(6)/MCL (ref 4.2–5.4)
SODIUM SERPL-SCNC: 142 MMOL/L (ref 136–145)
WBC # BLD AUTO: 9.15 X10(3)/MCL (ref 4.5–11.5)

## 2025-01-24 PROCEDURE — 36415 COLL VENOUS BLD VENIPUNCTURE: CPT

## 2025-01-24 PROCEDURE — 85025 COMPLETE CBC W/AUTO DIFF WBC: CPT

## 2025-01-24 PROCEDURE — 82378 CARCINOEMBRYONIC ANTIGEN: CPT

## 2025-01-24 PROCEDURE — 86300 IMMUNOASSAY TUMOR CA 15-3: CPT

## 2025-01-24 PROCEDURE — 80053 COMPREHEN METABOLIC PANEL: CPT

## 2025-01-27 LAB — CANCER AG27-29 SERPL-ACNC: 45 U/ML

## 2025-02-18 DIAGNOSIS — C50.512 MALIGNANT NEOPLASM OF LOWER-OUTER QUADRANT OF LEFT FEMALE BREAST, UNSPECIFIED ESTROGEN RECEPTOR STATUS: ICD-10-CM

## 2025-02-18 RX ORDER — ANASTROZOLE 1 MG/1
1 TABLET ORAL
Qty: 30 TABLET | Refills: 4 | Status: SHIPPED | OUTPATIENT
Start: 2025-02-18

## 2025-05-30 DIAGNOSIS — C50.512 MALIGNANT NEOPLASM OF LOWER-OUTER QUADRANT OF LEFT FEMALE BREAST, UNSPECIFIED ESTROGEN RECEPTOR STATUS: Primary | ICD-10-CM

## 2025-05-30 DIAGNOSIS — M85.89 OSTEOPENIA OF MULTIPLE SITES: ICD-10-CM

## 2025-07-08 NOTE — PROGRESS NOTES
Subjective:       Patient ID: Ileana Mathews is a 74 y.o. female.    Chief Complaint:  I'm doing okay    Diagnosis: Recurrent metastatic breast cancer - ER low +/IN -, HER-2 negative ([facility] 0)                    Solitary left lung met s/p left pneumonectomy 11/18                    S/p bilateral mastectomies                    Postmenopausal s/p hysterectomy    Treatment History  Neoadjuvant TC x4 2008 --> B mastectomies --> TC x4 --> XRT --> Anastrozole x 7 yrs    Current Treatment:  Anastrozole 1 mg daily (restarted 11/18)    Clinical History:  Patient was initially diagnosed with a stage III left breast cancer in 2008 with 2 out of 27 positive axillary lymph nodes. She was treated by Dr. Donald Foss in [place]. Her pathology was mixed ductal and lobular - ER +88%, IN +26% and HER-2 negative ([facility]HC). She underwent neoadjuvant chemotherapy with 4 cycles of TC followed by bilateral mastectomies and 4 more cycles of TC. After completion of chemotherapy, she received adjuvant radiation therapy followed by Arimidex for 7 years. She transitioned to the [facility] 6/16 with Dr. Yan. Ongoing observation was recommended. A solitary pulmonary nodule was discovered on surveillance imaging in the left lung in 2018. Follow-up scan showed increased size of the nodule with no other findings of metastatic disease. PET scan showed no additional suspicious areas. She was referred to CV surgery for a planned wedge resection of the pulmonary nodule. Due to complications at the time of surgery, she required a left pneumonectomy. I do not have a copy of the operative report, but the patient stated there were bleeding complications due to a vascular injury. Surgical pathology showed a metastatic adenocarcinoma consistent with breast primary measuring 1.1 cm. Cells were positive for CK7, MIREYA-3, GCDFP15 and mammoglobin and negative for TTF-1, CK 20, CDX2 and PAX-8. 10 resected lymph nodes were negative for  involvement. Tumor was low ER +1.8%, ND negative and HER-2 negative ([facility]). She was placed back on Arimidex 1 mg daily.    Surveillance CT of the chest 12/5/19 ([facility]) showed postoperative changes from previous left pneumonectomy and no findings suspicious for metastatic disease including the visualized upper abdomen. She was seen in the ER for lower abdominal pain 2/1/20. CT scan of the abdomen and pelvis without contrast showed a large amount of stool in the colon and rectum, cholelithiasis and a left renal cyst. CT scan of the chest 2/27/20 noted a 3 mm nodule in the right anterior lung which on retrospect was present and stable on the previous CT scan. Left pneumonectomy changes were stable with mediastinal shift. There were no other lesions suspicious for metastatic disease.    She was seen as a new patient [facility]11[facility]20 to establish ongoing Oncology care and follow-up. She was still taking Arimidex 1 mg daily. She had no significant side effects related to the treatment. She was continued on Arimidex. Bone density exam 10/15/20 showed osteopenia of the AP spine, left and right hip with T scores of -1.0, -2.0 and -2.0 respectively.      CT C/A/P 8/23/21 ([facility]):  Changes from prior pneumonectomy with a small left pleural fluid collection and chronic emphysematous changes in the right lung.  There was a small, stable RUL nodule unchanged from previous exams.  There was no evidence of metastatic disease in the abdomen or pelvis.   CT C/A/P 2/16/22 ([facility]):  Sable changes from prior left pneumonectomy and a subcentimeter triangular focus in the right upper lobe unchanged from her previous exams.    CT C/A/P 2/24/23 ([facility]):  Previous left pneumonectomy with hyperexpanded right lung.  Encapsulated pleural effusion left hemithorax.  Mild subpleural interstitial reticulation and scarring of the right lung and two 5 mm nodules RUL (described on previous scans).  A subcentimeter  hypodensity in hepatic segment 6 was too small to characterize.  Outside films were not available for direct comparison.  CT C/A/P 8/2/23:  Stable postoperative changes and small RUL nodules.  No findings suspicious for disease recurrence.  DEXA Bone Density 2/24/23:  Osteopenia L-spine, T-score -1.2, left hip -2.2, right hip -2.4.  CT C/A/P 7/17/24:  Stable postoperative changes from left pneumonectomy and bilateral mastectomies.  No evidence of metastatic disease.       Interval History   She returns to clinic to for a 1 year follow-up visit accompanied by her .  He underwent a liver transplant just over a year ago.  She remains on treatment with anastrozole 1 mg daily restarted after her left pneumonectomy.  She continues to tolerate treatment well.  She is not having any significant hot flashes.  She has chronic joint symptoms related to her arthritis.  She has not had any significant medical problems or health issues since her last visit.  She reports no changes on her self examination.  Her older sister passed away just 2 weeks ago.  Her daughter was diagnosed with breast cancer at the age of 48.  Her bone density exam 7/16/25 showed stable osteopenia of the lumbar spine with a T-score of -1.1 and improved osteopenia of both hips with a T-score of -1.8 on the left and -2.1 on the right.  She is on weekly Fosamax.  Laboratory testing showed a very mildly elevated CA 27-29 level, but improved from previous testing.       Review of Systems   Constitutional:  Negative for appetite change, fatigue, fever and unexpected weight change.   HENT:  Negative for mouth sores, sore throat and trouble swallowing.    Eyes: Negative.    Respiratory:  Positive for shortness of breath (URIBE). Negative for cough.    Cardiovascular:  Negative for chest pain, palpitations and leg swelling.   Gastrointestinal:  Negative for abdominal distention, abdominal pain, constipation, diarrhea and nausea.   Genitourinary:  Negative for  dysuria, frequency and urgency.   Musculoskeletal:  Positive for arthralgias and neck pain. Negative for back pain.   Integumentary:  Negative for pallor and rash.   Neurological:  Negative for dizziness, weakness, numbness and headaches.   Hematological:  Negative for adenopathy. Does not bruise/bleed easily.   Psychiatric/Behavioral: Negative.         PMHx:  HTN, hyperlipidemia, diabetes mellitus, SVT, COPD, GERD, arthritis, anxiety/depression, panic attacks  PSHx:  Tonsils, bladder suspension, hysterectomy/BSO, rhinoplasty, Mediport placement and removal, left pneumonectomy  SH:  Former smoker 1-2.5 PPD x 40 yrs, quit 11/18. Rare alcohol use. Lives in [place] with her , retired dispatcher for the [employer]  FH:  Her mother had bilateral breast cancer, daughter had breast cancer (48), paternal grandmother had lung cancer.     Objective:     Vitals:    07/22/25 1125   BP: 129/75   Pulse: 73   Resp: 15   Temp: 98.4 °F (36.9 °C)       Physical Exam  Constitutional:       Comments: Elderly, well-developed white female in NAD   HENT:      Head: Normocephalic.      Mouth/Throat:      Mouth: Mucous membranes are moist.      Pharynx: Oropharynx is clear. No posterior oropharyngeal erythema.   Eyes:      Extraocular Movements: Extraocular movements intact.      Conjunctiva/sclera: Conjunctivae normal.      Pupils: Pupils are equal, round, and reactive to light.   Cardiovascular:      Rate and Rhythm: Normal rate and regular rhythm.      Heart sounds: No murmur heard.  Pulmonary:      Comments: Well-healed left thoracotomy incision.  Absent breath sounds on the left, clear on the right.  Chest:      Comments: Well-healed bilateral mastectomy incisions.  No palpable masses, skin changes or axillary nodes bilaterally.  Abdominal:      General: Bowel sounds are normal. There is no distension.      Palpations: Abdomen is soft.      Tenderness: There is no abdominal tenderness.   Musculoskeletal:         General: No  swelling or tenderness. Normal range of motion.      Cervical back: Neck supple. No tenderness.   Skin:     General: Skin is warm and dry.      Findings: No rash.   Neurological:      General: No focal deficit present.      Mental Status: She is oriented to person, place, and time.      Cranial Nerves: No cranial nerve deficit.      Motor: No weakness.       ECOG SCORE    0 - Fully active-able to carry on all pre-disease performance without restriction        LABORATORY  Lab Results   Component Value Date/Time    WBC 9.20 07/15/2025 11:21 AM    RBC 3.96 (L) 07/15/2025 11:21 AM    HGB 11.9 (L) 07/15/2025 11:21 AM    HCT 36.8 (L) 07/15/2025 11:21 AM     07/15/2025 11:21 AM    ABSNEUTRO 6.90 07/15/2025 11:21 AM    NEUTROAUTO 74.9 07/15/2025 11:21 AM    ABSLYMPH 1.56 07/15/2025 11:21 AM    LYMPHAUTO 17.0 07/15/2025 11:21 AM    ABSEOS 0.14 07/15/2025 11:21 AM    EOSAUTO 1.5 07/15/2025 11:21 AM    ABSBASO 0.06 07/15/2025 11:21 AM    BASOPHILAUTO 0.7 07/15/2025 11:21 AM        Chemistry        Component Value Date/Time     (L) 07/15/2025 1121     12/12/2024 1328    K 4.3 07/15/2025 1121    K 4.7 12/12/2024 1328    CL 97 (L) 07/15/2025 1121    CL 95 (L) 12/12/2024 1328    CO2 26 07/15/2025 1121    CO2 25 12/12/2024 1328    BUN 24.7 (H) 07/15/2025 1121    BUN 28 (H) 12/12/2024 1328    CREATININE 1.14 (H) 07/15/2025 1121    CREATININE 0.98 12/12/2024 1328    GLU 99 07/15/2025 1121        Component Value Date/Time    CALCIUM 10.1 07/15/2025 1121    CALCIUM 10.4 (H) 12/12/2024 1328    ALKPHOS 90 07/15/2025 1121    AST 21 07/15/2025 1121    ALT 8 07/15/2025 1121    BILITOT 0.4 07/15/2025 1121    EGFRNONAA 46 05/27/2022 1314           Latest Reference Range & Units 02/16/24 11:15 01/24/25 12:48 07/15/25 11:21   Breast Carcinoma Assoc Ag(CA 27.29) <=38.0 U/mL 36.3 45.0 (H) 42.4 (H)       Assessment:   Recurrent metastatic breast cancer - initial diagnosis 2008, ER/TX +, HER-2 negative  Solitary pulmonary met s/p  left pneumonectomy 11/18 - ER low +, PA negative, HER-2 negative (I[facility] 0)  S/p bilateral mastectomies  Postmenopausal s/p hysterectomy  Osteopenia      Plan:   Patient has no clinical or laboratory findings suspicious for disease recurrence.  She will continue on treatment with Anastrozole 1 mg daily.  Bone density shows interval improvement on weekly Fosamax.    RTC in 1 year for a follow-up visit and clinical exam with repeat laboratory.  Will defer additional surveillance imaging unless she develops new symptoms or problems.      ESPERANZA KAHN MD    Other Physicians  Dr. Kings Gilbert

## 2025-07-15 ENCOUNTER — LAB VISIT (OUTPATIENT)
Dept: LAB | Facility: HOSPITAL | Age: 75
End: 2025-07-15
Attending: INTERNAL MEDICINE
Payer: MEDICARE

## 2025-07-15 DIAGNOSIS — C50.512 MALIGNANT NEOPLASM OF LOWER-OUTER QUADRANT OF LEFT FEMALE BREAST, UNSPECIFIED ESTROGEN RECEPTOR STATUS: ICD-10-CM

## 2025-07-15 LAB
ALBUMIN SERPL-MCNC: 4.2 G/DL (ref 3.4–4.8)
ALBUMIN/GLOB SERPL: 0.9 RATIO (ref 1.1–2)
ALP SERPL-CCNC: 90 UNIT/L (ref 40–150)
ALT SERPL-CCNC: 8 UNIT/L (ref 0–55)
ANION GAP SERPL CALC-SCNC: 10 MEQ/L
AST SERPL-CCNC: 21 UNIT/L (ref 11–45)
BASOPHILS # BLD AUTO: 0.06 X10(3)/MCL
BASOPHILS NFR BLD AUTO: 0.7 %
BILIRUB SERPL-MCNC: 0.4 MG/DL
BUN SERPL-MCNC: 24.7 MG/DL (ref 9.8–20.1)
CALCIUM SERPL-MCNC: 10.1 MG/DL (ref 8.4–10.2)
CEA SERPL-MCNC: 2.3 NG/ML (ref 0–3)
CHLORIDE SERPL-SCNC: 97 MMOL/L (ref 98–107)
CO2 SERPL-SCNC: 26 MMOL/L (ref 23–31)
CREAT SERPL-MCNC: 1.14 MG/DL (ref 0.55–1.02)
CREAT/UREA NIT SERPL: 22
EOSINOPHIL # BLD AUTO: 0.14 X10(3)/MCL (ref 0–0.9)
EOSINOPHIL NFR BLD AUTO: 1.5 %
ERYTHROCYTE [DISTWIDTH] IN BLOOD BY AUTOMATED COUNT: 13.7 % (ref 11.5–17)
GFR SERPLBLD CREATININE-BSD FMLA CKD-EPI: 51 ML/MIN/1.73/M2
GLOBULIN SER-MCNC: 4.5 GM/DL (ref 2.4–3.5)
GLUCOSE SERPL-MCNC: 99 MG/DL (ref 82–115)
HCT VFR BLD AUTO: 36.8 % (ref 37–47)
HGB BLD-MCNC: 11.9 G/DL (ref 12–16)
IMM GRANULOCYTES # BLD AUTO: 0.02 X10(3)/MCL (ref 0–0.04)
IMM GRANULOCYTES NFR BLD AUTO: 0.2 %
LYMPHOCYTES # BLD AUTO: 1.56 X10(3)/MCL (ref 0.6–4.6)
LYMPHOCYTES NFR BLD AUTO: 17 %
MCH RBC QN AUTO: 30.1 PG (ref 27–31)
MCHC RBC AUTO-ENTMCNC: 32.3 G/DL (ref 33–36)
MCV RBC AUTO: 92.9 FL (ref 80–94)
MONOCYTES # BLD AUTO: 0.52 X10(3)/MCL (ref 0.1–1.3)
MONOCYTES NFR BLD AUTO: 5.7 %
NEUTROPHILS # BLD AUTO: 6.9 X10(3)/MCL (ref 2.1–9.2)
NEUTROPHILS NFR BLD AUTO: 74.9 %
PLATELET # BLD AUTO: 270 X10(3)/MCL (ref 130–400)
PMV BLD AUTO: 10.2 FL (ref 7.4–10.4)
POTASSIUM SERPL-SCNC: 4.3 MMOL/L (ref 3.5–5.1)
PROT SERPL-MCNC: 8.7 GM/DL (ref 5.8–7.6)
RBC # BLD AUTO: 3.96 X10(6)/MCL (ref 4.2–5.4)
SODIUM SERPL-SCNC: 133 MMOL/L (ref 136–145)
WBC # BLD AUTO: 9.2 X10(3)/MCL (ref 4.5–11.5)

## 2025-07-15 PROCEDURE — 82378 CARCINOEMBRYONIC ANTIGEN: CPT

## 2025-07-15 PROCEDURE — 80053 COMPREHEN METABOLIC PANEL: CPT

## 2025-07-15 PROCEDURE — 36415 COLL VENOUS BLD VENIPUNCTURE: CPT

## 2025-07-15 PROCEDURE — 85025 COMPLETE CBC W/AUTO DIFF WBC: CPT

## 2025-07-15 PROCEDURE — 86300 IMMUNOASSAY TUMOR CA 15-3: CPT

## 2025-07-16 ENCOUNTER — HOSPITAL ENCOUNTER (OUTPATIENT)
Dept: RADIOLOGY | Facility: HOSPITAL | Age: 75
Discharge: HOME | End: 2025-07-16
Attending: INTERNAL MEDICINE
Payer: MEDICARE

## 2025-07-16 DIAGNOSIS — M85.89 OSTEOPENIA OF MULTIPLE SITES: ICD-10-CM

## 2025-07-16 PROCEDURE — 77080 DXA BONE DENSITY AXIAL: CPT | Mod: TC

## 2025-07-21 LAB — CANCER AG27-29 SERPL-ACNC: 42.4 U/ML

## 2025-07-22 ENCOUNTER — OFFICE VISIT (OUTPATIENT)
Dept: HEMATOLOGY/ONCOLOGY | Facility: CLINIC | Age: 75
End: 2025-07-22
Payer: MEDICARE

## 2025-07-22 VITALS
HEART RATE: 73 BPM | RESPIRATION RATE: 15 BRPM | BODY MASS INDEX: 25.2 KG/M2 | WEIGHT: 142.19 LBS | OXYGEN SATURATION: 94 % | SYSTOLIC BLOOD PRESSURE: 129 MMHG | DIASTOLIC BLOOD PRESSURE: 75 MMHG | HEIGHT: 63 IN | TEMPERATURE: 98 F

## 2025-07-22 DIAGNOSIS — C50.512 MALIGNANT NEOPLASM OF LOWER-OUTER QUADRANT OF LEFT FEMALE BREAST, UNSPECIFIED ESTROGEN RECEPTOR STATUS: Primary | ICD-10-CM

## 2025-07-22 DIAGNOSIS — M85.80 OSTEOPENIA, UNSPECIFIED LOCATION: ICD-10-CM

## 2025-07-22 PROCEDURE — 99999 PR PBB SHADOW E&M-EST. PATIENT-LVL IV: CPT | Mod: PBBFAC,,, | Performed by: INTERNAL MEDICINE

## 2025-07-22 PROCEDURE — 99214 OFFICE O/P EST MOD 30 MIN: CPT | Mod: PBBFAC | Performed by: INTERNAL MEDICINE

## 2025-07-22 PROCEDURE — 99214 OFFICE O/P EST MOD 30 MIN: CPT | Mod: S$PBB,,, | Performed by: INTERNAL MEDICINE

## 2025-07-22 RX ORDER — NAPROXEN 500 MG/1
500 TABLET ORAL
COMMUNITY
Start: 2025-07-17 | End: 2025-07-24

## 2025-07-22 RX ORDER — TIRZEPATIDE 2.5 MG/.5ML
INJECTION, SOLUTION SUBCUTANEOUS
COMMUNITY

## 2025-07-22 RX ORDER — UMECLIDINIUM BROMIDE AND VILANTEROL TRIFENATATE 62.5; 25 UG/1; UG/1
POWDER RESPIRATORY (INHALATION)
COMMUNITY
Start: 2025-06-18

## 2025-07-22 RX ORDER — ALENDRONATE SODIUM 70 MG/1
70 TABLET ORAL
Qty: 4 TABLET | Refills: 5 | Status: SHIPPED | OUTPATIENT
Start: 2025-07-22 | End: 2026-01-06